# Patient Record
Sex: FEMALE | Race: BLACK OR AFRICAN AMERICAN | NOT HISPANIC OR LATINO | Employment: UNEMPLOYED | URBAN - METROPOLITAN AREA
[De-identification: names, ages, dates, MRNs, and addresses within clinical notes are randomized per-mention and may not be internally consistent; named-entity substitution may affect disease eponyms.]

---

## 2022-11-15 ENCOUNTER — HOSPITAL ENCOUNTER (EMERGENCY)
Facility: HOSPITAL | Age: 37
Discharge: HOME | End: 2022-11-16

## 2022-11-15 ENCOUNTER — APPOINTMENT (OUTPATIENT)
Dept: RADIOLOGY | Facility: HOSPITAL | Age: 37
End: 2022-11-15

## 2022-11-15 VITALS
RESPIRATION RATE: 18 BRPM | BODY MASS INDEX: 37.11 KG/M2 | HEIGHT: 60 IN | WEIGHT: 189 LBS | DIASTOLIC BLOOD PRESSURE: 70 MMHG | TEMPERATURE: 99 F | HEART RATE: 81 BPM | SYSTOLIC BLOOD PRESSURE: 143 MMHG | OXYGEN SATURATION: 100 %

## 2022-11-15 DIAGNOSIS — M25.511 ACUTE PAIN OF RIGHT SHOULDER: Primary | ICD-10-CM

## 2022-11-15 RX ORDER — ACETAMINOPHEN 325 MG/1
650 TABLET ORAL ONCE
Status: COMPLETED | OUTPATIENT
Start: 2022-11-15 | End: 2022-11-15

## 2022-11-15 RX ORDER — LIDOCAINE 50 MG/G
1 PATCH TOPICAL ONCE
Status: DISCONTINUED | OUTPATIENT
Start: 2022-11-15 | End: 2022-11-16 | Stop reason: HOSPADM

## 2022-11-15 RX ADMIN — LIDOCAINE 5% 1 PATCH: 700 PATCH TOPICAL at 23:31

## 2022-11-15 RX ADMIN — ACETAMINOPHEN 650 MG: 325 TABLET, FILM COATED ORAL at 23:32

## 2022-11-16 ENCOUNTER — OFFICE VISIT (OUTPATIENT)
Dept: OBGYN CLINIC | Facility: CLINIC | Age: 37
End: 2022-11-16

## 2022-11-16 VITALS
DIASTOLIC BLOOD PRESSURE: 76 MMHG | BODY MASS INDEX: 37.11 KG/M2 | SYSTOLIC BLOOD PRESSURE: 126 MMHG | HEIGHT: 60 IN | WEIGHT: 189 LBS

## 2022-11-16 DIAGNOSIS — M25.511 ACUTE PAIN OF RIGHT SHOULDER: ICD-10-CM

## 2022-11-16 DIAGNOSIS — M75.51 ACUTE SHOULDER BURSITIS, RIGHT: Primary | ICD-10-CM

## 2022-11-16 RX ORDER — TRIAMCINOLONE ACETONIDE 40 MG/ML
40 INJECTION, SUSPENSION INTRA-ARTICULAR; INTRAMUSCULAR
Status: COMPLETED | OUTPATIENT
Start: 2022-11-16 | End: 2022-11-16

## 2022-11-16 RX ORDER — LIDOCAINE HYDROCHLORIDE 10 MG/ML
4 INJECTION, SOLUTION INFILTRATION; PERINEURAL
Status: COMPLETED | OUTPATIENT
Start: 2022-11-16 | End: 2022-11-16

## 2022-11-16 RX ORDER — OMEPRAZOLE 20 MG/1
20 TABLET, DELAYED RELEASE ORAL DAILY
COMMUNITY

## 2022-11-16 RX ADMIN — TRIAMCINOLONE ACETONIDE 40 MG: 40 INJECTION, SUSPENSION INTRA-ARTICULAR; INTRAMUSCULAR at 13:51

## 2022-11-16 RX ADMIN — LIDOCAINE HYDROCHLORIDE 4 ML: 10 INJECTION, SOLUTION INFILTRATION; PERINEURAL at 13:51

## 2022-11-16 NOTE — DISCHARGE INSTRUCTIONS
RICE - rest, ice, compression (sling), elevation  Take ibuprofen or tylenol every 4-6 hours for pain   Lidocaine patches 12 hours on 12 hours off  Schedule an appointment with the orthopedist as soon as possible   Referral placed  Return to the ER if you begin to develop intense pain in your shoulder that is worse or different than before, cant move arm or it feels numb, cold, blue, red hot, swollen, red streaking from shoulder, fevers, chest pain, shortness of breath

## 2022-11-16 NOTE — PROGRESS NOTES
[facility] [address]  1847 [address] [place]  [address] [address] [phone]      Chief Complaint:  Chief Complaint   Patient presents with   • Right Shoulder - Pain, Swelling, Numbness       Vitals:  /76   Ht 5' (1 524 m)   Wt 85 7 kg (189 lb)   LMP 11/15/2022 (Exact Date)   BMI 36 91 kg/m²     The following portions of the patient's history were reviewed and updated as appropriate: allergies, current medications, past family history, past medical history, past social history, past surgical history, and problem list       Subjective:   Patient ID: Eve Martinez is a 40 y o  female  Here c/o R shoulder pain  Pain for about 3 days  Seen in ER  Note reviewed  XR done  Cant use R arm  RHD  Hurts to reach up/back/out  Shooting pain at times while at rest  Denies neck pain  Taking tylenol/advil PRN  Sharp pain    RIGHT SHOULDER     INDICATION:   injury      COMPARISON:  None     VIEWS:  XR SHOULDER 2+ VW RIGHT  Images: 3     FINDINGS:     There is no acute fracture or dislocation      No significant degenerative changes      No lytic or blastic osseous lesion      Calcifications along the superolateral humeral head which may be indicative of calcific tendinitis      IMPRESSION:     No acute osseous abnormality  Review of Systems   Constitutional: Negative for fatigue and fever  Respiratory: Negative for shortness of breath  Cardiovascular: Negative for chest pain  Gastrointestinal: Negative for abdominal pain and nausea  Genitourinary: Negative for dysuria  Musculoskeletal: Positive for arthralgias  Skin: Negative for rash and wound  Neurological: Negative for weakness and headaches  Objective:  Right Shoulder Exam     Tenderness   The patient is experiencing tenderness in the acromioclavicular joint and acromion      Range of Motion   Active abduction:  70 abnormal   Passive abduction:  130 abnormal   Extension: normal   External rotation: normal   Forward flexion:  140 abnormal     Tests   Fabian test: positive  Impingement: positive    Comments:  Pos empty can            Physical Exam  Constitutional:       Appearance: Normal appearance  She is normal weight  HENT:      Head: Normocephalic  Eyes:      Extraocular Movements: Extraocular movements intact  Pulmonary:      Effort: Pulmonary effort is normal    Musculoskeletal:      Cervical back: Normal range of motion  Skin:     General: Skin is warm and dry  Neurological:      General: No focal deficit present  Mental Status: She is alert and oriented to person, place, and time  Mental status is at baseline  Psychiatric:         Mood and Affect: Mood normal          Behavior: Behavior normal          Thought Content: Thought content normal          Judgment: Judgment normal      Large joint arthrocentesis: R subacromial bursa  Universal Protocol:  Consent: Verbal consent obtained  Risks and benefits: risks, benefits and alternatives were discussed  Consent given by: patient  Time out: Immediately prior to procedure a "time out" was called to verify the correct patient, procedure, equipment, support staff and site/side marked as required  Timeout called at: 11/16/2022 1:49 PM   Site marked: the operative site was marked  Supporting Documentation  Indications: pain   Procedure Details  Location: shoulder - R subacromial bursa  Preparation: Patient was prepped and draped in the usual sterile fashion  Needle size: 25 G  Ultrasound guidance: no  Approach: posterolateral  Medications administered: 4 mL lidocaine 1 %; 40 mg triamcinolone acetonide 40 mg/mL    Patient tolerance: patient tolerated the procedure well with no immediate complications  Dressing:  Sterile dressing applied            I have personally reviewed pertinent films in PACS and my interpretation is XR- R shoulder- no fx, calcific tendintis superolateral humeral head        Assessment/Plan:  Assessment/Plan   Diagnoses and all orders for this visit:    Acute shoulder bursitis, right    Acute pain of right shoulder  -     Ambulatory Referral to Orthopedic Surgery    Other orders  -     omeprazole (PriLOSEC OTC) 20 MG tablet; Take 20 mg by mouth daily  -     Large joint arthrocentesis        Return in about 6 weeks (around 12/28/2022) for Recheck       Esau Gao MD

## 2022-11-16 NOTE — ED PROVIDER NOTES
History  Chief Complaint   Patient presents with   • Arm Pain     Right Arm pain started on Sunday  Declines any falls or injuries  Pt stated its painful to lift  This is a 39 y/o female with no pertinent PMH with right shoulder pain x 2-3 days  Patient states she slept weird on it the night before and woke up with pain  No known injury  She is right handed  States her pain is a 10/10 an will intermittently shoot down her arm  Admits to decreased ROM due to the pain  She denies numbness, tingling, change in color, swelling, fevers, chest pain, shortness of breath, neck pain  States she tried tylenol/advil with no relief  Did not taken anything today  Admits to shoulder injury in the past but no treatments provided  No known allergies  History provided by:  Patient   used: No    Arm Pain  Location:  Right shoulder  Severity:  Moderate  Onset quality:  Sudden  Duration:  3 days  Timing:  Intermittent  Progression:  Waxing and waning  Chronicity:  New  Ineffective treatments:  Advil, tylenol  Associated symptoms: no chest pain, no fever, no headaches, no nausea, no shortness of breath and no vomiting        None       History reviewed  No pertinent past medical history  History reviewed  No pertinent surgical history  History reviewed  No pertinent family history  I have reviewed and agree with the history as documented  E-Cigarette/Vaping     E-Cigarette/Vaping Substances     Social History     Tobacco Use   • Smoking status: Every Day   • Smokeless tobacco: Never   Substance Use Topics   • Drug use: Yes     Types: Marijuana       Review of Systems   Constitutional: Negative for chills and fever  Respiratory: Negative for chest tightness and shortness of breath  Cardiovascular: Negative for chest pain  Gastrointestinal: Negative for nausea and vomiting  Musculoskeletal: Positive for arthralgias  Negative for joint swelling, neck pain and neck stiffness     Skin: Negative for color change  Neurological: Negative for headaches  Psychiatric/Behavioral: Negative for behavioral problems and sleep disturbance  All other systems reviewed and are negative  Physical Exam  Physical Exam  Vitals and nursing note reviewed  Constitutional:       General: She is awake  Appearance: Normal appearance  She is well-developed  HENT:      Head: Normocephalic and atraumatic  Right Ear: External ear normal       Left Ear: External ear normal       Nose: Nose normal    Eyes:      General: No scleral icterus  Extraocular Movements: Extraocular movements intact  Conjunctiva/sclera: Conjunctivae normal       Pupils: Pupils are equal, round, and reactive to light  Cardiovascular:      Rate and Rhythm: Normal rate and regular rhythm  Pulses:           Radial pulses are 2+ on the right side  Heart sounds: Normal heart sounds, S1 normal and S2 normal  No murmur heard  No gallop  Pulmonary:      Effort: Pulmonary effort is normal       Breath sounds: Normal breath sounds  No wheezing, rhonchi or rales  Musculoskeletal:         General: Normal range of motion  Cervical back: Normal range of motion and neck supple  No tenderness  Comments: Right shoulder tender to palpation everywhere  No localized point of tenderness  Decreased ROM due to pain  Drop arm test positive  5/5 strength  No swelling, erythema, ecchymosis, deformity, crepitus noted  NV and sensation intact  Radial pulse +2  Cap refill brisk  Skin:     General: Skin is warm and dry  Neurological:      General: No focal deficit present  Mental Status: She is alert  Psychiatric:         Attention and Perception: Attention and perception normal          Mood and Affect: Mood normal          Behavior: Behavior normal  Behavior is cooperative           Vital Signs  ED Triage Vitals [11/15/22 2206]   Temperature Pulse Respirations Blood Pressure SpO2   99 °F (37 2 °C) 81 18 143/70 100 % Temp Source Heart Rate Source Patient Position - Orthostatic VS BP Location FiO2 (%)   Axillary Monitor Sitting Left arm --      Pain Score       5           Vitals:    11/15/22 2206   BP: 143/70   Pulse: 81   Patient Position - Orthostatic VS: Sitting         Visual Acuity      ED Medications  Medications   acetaminophen (TYLENOL) tablet 650 mg (650 mg Oral Given 11/15/22 2332)       Diagnostic Studies  Results Reviewed     None                 XR shoulder 2+ views RIGHT   Final Result by Star Engle MD (11/16 0901)      No acute osseous abnormality  Workstation performed: [id number]                    Procedures  Procedures         ED Course                                             MDM  Number of Diagnoses or Management Options  Acute pain of right shoulder: new and requires workup  Diagnosis management comments: 39 y/o female here for right shoulder pain   Differential diagnosis: fracture, dislocation, rotator cuff injury, MSK pain   Assessment: acute pain right shoulder  Plan: calcification noted on humeral head on xray but no fractures/dislocations  Based on exam possible rotator cuff injury  Referral sent to ortho and patient given contact info instructed to f/u  Tylenol and lido patch for pain in ED and patient given care instructions  Patient  was given strict return to ER precautions both verbally and in discharge papers  Patient verbalized understanding and agrees with plan          Amount and/or Complexity of Data Reviewed  Tests in the radiology section of CPT®: ordered and reviewed    Risk of Complications, Morbidity, and/or Mortality  Presenting problems: low  Diagnostic procedures: low  Management options: low    Patient Progress  Patient progress: stable      Disposition  Final diagnoses:   Acute pain of right shoulder     Time reflects when diagnosis was documented in both MDM as applicable and the Disposition within this note     Time User Action Codes Description Comment 11/15/2022 11:29 PM Ruben Park Add [M25 511] Acute pain of right shoulder       ED Disposition     ED Disposition   Discharge    Condition   Stable    Date/Time   Tue Nov 15, 2022 11:29 PM    Comment   Desi Chávez discharge to home/self care  Follow-up Information     Follow up With Specialties Details Why Contact Info Additional [address] [facility] Orthopedic Surgery Schedule an appointment as soon as possible for a visit   [address] [place] [address] [address] [phone]  [address] [facility], [address] [place] [address], [place], [place], [facility] [address]     [address] Emergency Department Emergency Medicine Go to  if you begin to develop intense pain in your shoulder that is worse or different than before, cant move arm or it feels numb, cold, blue, red hot, swollen, red streaking from shoulder, fevers, chest pain, shortness of breath [address] [facility] Emergency Department, [address], [facility], Luige Andrew 10          There are no discharge medications for this patient            PDMP Review     None          ED Provider  Electronically Signed by           Celia Wilkins PA-C  11/16/22 4496

## 2023-01-12 ENCOUNTER — EVALUATION (OUTPATIENT)
Dept: PHYSICAL THERAPY | Facility: CLINIC | Age: 38
End: 2023-01-12

## 2023-01-12 DIAGNOSIS — M75.41 IMPINGEMENT SYNDROME OF RIGHT SHOULDER: Primary | ICD-10-CM

## 2023-01-12 DIAGNOSIS — M75.51 ACUTE SHOULDER BURSITIS, RIGHT: ICD-10-CM

## 2023-01-12 NOTE — PROGRESS NOTES
PT Evaluation     Today's date: 2023  Patient name: Sunita Hart  : 1985  MRN: [id number]  Referring provider: Lakisha Suero MD  Dx:   Encounter Diagnosis     ICD-10-CM    1  Impingement syndrome of right shoulder  M75 41 Ambulatory Referral to Physical Therapy      2  Acute shoulder bursitis, right  M75 51 Ambulatory Referral to Physical Therapy                     Assessment  Assessment details: Sunita Hart is a 40 y o  female presenting to outpatient physical therapy at [place] [address] with complaints of anterior, deep R shoulder pain  She presents with decreased self-limited active range of motion, decreased R shoulder and postural strength, limited pec minor flexibility, poor postural awareness, decreased tolerance to activity and decreased functional mobility due to Acute pain of right shoulder due to shoulder impingement  She would benefit from skilled PT services in order to address these deficits and reach maximum level of function  Thank you for the referral!  Impairments: abnormal or restricted ROM, activity intolerance, impaired physical strength, lacks appropriate home exercise program, pain with function and poor posture   Barriers to therapy: None  Understanding of Dx/Px/POC: good  Goals  ST  Independent with HEP in 2 weeks  2  Increase R shoulder AROM to WNL all motions in 3 weeks   3  Good postural awareness in 2 weeks    LT  Achieve FOTO score of 54/100 in 6 weeks   2  Able to lift up to 30#, lift to [facility] levels without R shoulder pain in 6 weeks  3    Strength B traps and R shoulder = 5/5 all motions in 6 weeks      Plan  Patient would benefit from: skilled PT and PT eval  Planned modality interventions: cryotherapy, TENS and thermotherapy: hydrocollator packs  Planned therapy interventions: ADL retraining, flexibility, functional ROM exercises, home exercise program, joint mobilization, manual therapy, neuromuscular re-education, postural training, strengthening, stretching, therapeutic activities and therapeutic exercise  Frequency: 2x week  Duration in weeks: 6  Treatment plan discussed with: patient        Subjective Evaluation    History of Present Illness  Mechanism of injury: Pt reports having R anterior deep shoulder pain since 22 after sleeping on her R arm  Was very restricted in ROM and now has some L shoulder similar pain  Has hx of R shoulder pain 5 years ago that resolved on it's own  OOW, 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  Used to work in manufacturing and scheduling  Home with her children  Having difficulty lifting > 20# especially [place]  Had a cortisone injection in 2022 with only slight improvement  Recurrent probem    Quality of life: good    Pain  Current pain ratin  At best pain ratin  At worst pain rating: 10  Quality: dull ache, sharp and tight  Relieving factors: heat, ice and medications  Aggravating factors: lifting and overhead activity  Progression: worsening    Social Support  Lives with: young children    Employment status: not working  Hand dominance: right      Diagnostic Tests  X-ray: abnormal (R shoulder calcifications along the superolateral humeral head which may be indicative of calcific tendinitis )  Treatments  Previous treatment: medication and injection treatment  Patient Goals  Patient goals for therapy: increased strength, independence with ADLs/IADLs, return to sport/leisure activities, increased motion and decreased pain          Objective     Static Posture     Head  Forward  Shoulders  Rounded  Postural Observations  Seated posture: poor  Standing posture: fair  Correction of posture: makes symptoms better        Observations     Right Shoulder  Negative for effusion  Palpation     Additional Palpation Details  Mod tightness R>L pec minor  Tenderness     Right Shoulder  Tenderness in the [facility] joint  No tenderness in the biceps tendon (proximal) and supraspinatus tendon  Cervical/Thoracic Screen   Cervical range of motion within normal limits    Neurological Testing     Sensation     Shoulder   Left Shoulder   Intact: light touch    Right Shoulder   Intact: light touch    Active Range of Motion   Left Shoulder   Normal active range of motion    Right Shoulder   Flexion: 120 degrees with pain  Extension: WFL  Abduction: WFL  External rotation 0°: WFL  Internal rotation 90°: WFL    Passive Range of Motion   Left Shoulder   Normal passive range of motion    Right Shoulder   Normal passive range of motion    Strength/Myotome Testing     Left Shoulder     Planes of Motion   Flexion: 5   Extension: 5   Abduction: 5   External rotation at 0°: 5   Internal rotation at 0°: 5     Isolated Muscles   Biceps: 5   Middle trapezius: 4   Triceps: 5     Right Shoulder     Planes of Motion   Flexion: 4-   Extension: 5   Abduction: 4-   External rotation at 0°: 4+   Internal rotation at 0°: 5     Isolated Muscles   Biceps: 5   Middle trapezius: 4-   Triceps: 5     Additional Strength Details  Limited AROM and strength on R all self-limited  Tests     Right Shoulder   Positive Hawkin's  Negative empty can and Neer's           POC EXPIRES On:  2/23/23  PRECAUTIONS:  None  CO-MORBIDITES:  None  PERSONAL FACTORS:  Needs appt between 10am-3pm      Manuals HEP 1/12                                                               Neuro Re-Ed     Seated B scap retraction 1/12 1x every 15 min           TB rows B standing 1/12 Black 20           Prone rows L/R             Prone traps 3 way L/R             Wall angels                                       Ther Ex    Doorway pec stretch 1/12 15" 3                                                                                                      Ther Activity                              Gait Training                              Modalities

## 2023-01-19 ENCOUNTER — OFFICE VISIT (OUTPATIENT)
Dept: PHYSICAL THERAPY | Facility: CLINIC | Age: 38
End: 2023-01-19

## 2023-01-19 DIAGNOSIS — M75.41 IMPINGEMENT SYNDROME OF RIGHT SHOULDER: Primary | ICD-10-CM

## 2023-01-19 DIAGNOSIS — M75.51 ACUTE SHOULDER BURSITIS, RIGHT: ICD-10-CM

## 2023-01-19 NOTE — PROGRESS NOTES
Daily Note     Today's date: 2023  Patient name: Shania Hernandez  : 1985  MRN: [id number]  Referring provider: Erik Sheriff MD  Dx:   Encounter Diagnosis     ICD-10-CM    1  Impingement syndrome of right shoulder  M75 41       2  Acute shoulder bursitis, right  M75 51                      Subjective: Pt states feeling sore  Objective: See treatment diary below  Survey Monkey given (_____) and FOTO given (_____)        Assessment: Pt presented to outpatient physical therapy at [place] [address] with complaints of anterior, deep R shoulder pain  She presents with decreased self-limited active range of motion, decreased R shoulder and postural strength, limited pec minor flexibility, poor postural awareness, decreased tolerance to activity and decreased functional mobility due to Acute pain of right shoulder due to shoulder impingement  She would benefit from skilled PT services in order to address these deficits and reach maximum level of function  Introduced new TE with good tolerance, however, pt experienced increased ms fatigue during prone shoulder ext  Plan: Continue plan of care       POC EXPIRES On:  23  PRECAUTIONS:  None  CO-MORBIDITES:  None  PERSONAL FACTORS:  Needs appt between 10am-3pm      Manuals HEP                                                               Neuro Re-Ed     Seated B scap retraction  1x every 15 min 20" 3          TB rows B standing  Black 20 Black 20          Prone rows L/R   0# 10x ea          Prone traps 3 way L/R   0# 10x ea          Wall angels   20x                                    Ther Ex    Retro UBE    L1 5'          Doorway pec stretch  15" 3 15" 5                                                                                                     Ther Activity                              Gait Training                              Modalities

## 2023-01-24 ENCOUNTER — OFFICE VISIT (OUTPATIENT)
Dept: PHYSICAL THERAPY | Facility: CLINIC | Age: 38
End: 2023-01-24

## 2023-01-24 DIAGNOSIS — M75.41 IMPINGEMENT SYNDROME OF RIGHT SHOULDER: Primary | ICD-10-CM

## 2023-01-24 DIAGNOSIS — M75.51 ACUTE SHOULDER BURSITIS, RIGHT: ICD-10-CM

## 2023-01-24 NOTE — PROGRESS NOTES
Daily Note     Today's date: 2023  Patient name: Audi Phillip  : 1985  MRN: [id number]  Referring provider: Javier Shay MD  Dx:   Encounter Diagnosis     ICD-10-CM    1  Impingement syndrome of right shoulder  M75 41       2  Acute shoulder bursitis, right  M75 51                      Subjective: Pt states feeling sore after lv  Objective: See treatment diary below  Survey Monkey given (_____) and FOTO given (_____)        Assessment: Pt presented to outpatient physical therapy at [place] [address] with complaints of anterior, deep R shoulder pain  She presents with decreased self-limited active range of motion, decreased R shoulder and postural strength, limited pec minor flexibility, poor postural awareness, decreased tolerance to activity and decreased functional mobility due to Acute pain of right shoulder due to shoulder impingement  She would benefit from skilled PT services in order to address these deficits and reach maximum level of function  Introduced new TE with good tolerance, however, pt continues to experience increased ms fatigue during prone shoulder ext  Plan: Continue plan of care       POC EXPIRES On:  23  PRECAUTIONS:  None  CO-MORBIDITES:  None  PERSONAL FACTORS:  Needs appt between 10am-3pm       Manuals HEP                                                              Neuro Re-Ed     Seated B scap retraction  1x every 15 min 20" 3 20" 3         TB rows B standing  Black 20 Black 20 Black 20         Prone rows L/R   0# 10x ea 0# 10x ea         Prone traps 3 way L/R   0# 10x ea 0# 10x ea         Wall angels   20x 20x                                   Ther Ex    Retro UBE    L1 5' L1 5'         Doorway pec stretch  15" 3 15" 5 15" 5         Supine flexion    20x         Supine scapular punches    20x                                                                          Ther Activity                              Gait Training Modalities

## 2023-01-26 ENCOUNTER — APPOINTMENT (OUTPATIENT)
Dept: PHYSICAL THERAPY | Facility: CLINIC | Age: 38
End: 2023-01-26

## 2023-01-31 ENCOUNTER — APPOINTMENT (OUTPATIENT)
Dept: PHYSICAL THERAPY | Facility: CLINIC | Age: 38
End: 2023-01-31

## 2023-02-01 ENCOUNTER — APPOINTMENT (OUTPATIENT)
Dept: PHYSICAL THERAPY | Facility: CLINIC | Age: 38
End: 2023-02-01

## 2023-02-01 ENCOUNTER — OFFICE VISIT (OUTPATIENT)
Dept: PHYSICAL THERAPY | Facility: CLINIC | Age: 38
End: 2023-02-01

## 2023-02-01 DIAGNOSIS — M75.51 ACUTE SHOULDER BURSITIS, RIGHT: ICD-10-CM

## 2023-02-01 DIAGNOSIS — M75.41 IMPINGEMENT SYNDROME OF RIGHT SHOULDER: Primary | ICD-10-CM

## 2023-02-01 NOTE — PROGRESS NOTES
Daily Note     Today's date: 2023  Patient name: Yolis Baum  : 1985  MRN: [id number]  Referring provider: Robin Cannon MD  Dx:   Encounter Diagnosis     ICD-10-CM    1  Impingement syndrome of right shoulder  M75 41       2  Acute shoulder bursitis, right  M75 51                      Subjective: Pt states feeling sore after lv  Objective: See treatment diary below  Survey Monkey given (_____) and FOTO given (_____)        Assessment: Pt presented to outpatient physical therapy at [place] [address] with complaints of anterior, deep R shoulder pain  She presents with decreased self-limited active range of motion, decreased R shoulder and postural strength, limited pec minor flexibility, poor postural awareness, decreased tolerance to activity and decreased functional mobility due to Acute pain of right shoulder due to shoulder impingement  She would benefit from skilled PT services in order to address these deficits and reach maximum level of function  Introduced new TE with good tolerance and technique  No complain of pain solely increased ms soreness/fatigue  Plan: Continue plan of care       POC EXPIRES On:  23  PRECAUTIONS:  None  CO-MORBIDITES:  None  PERSONAL FACTORS:  Needs appt between 10am-3pm       Manuals HEP                                                             Neuro Re-Ed     Seated B scap retraction  1x every 15 min 20" 3 20" 3 20" 3        TB rows B standing  Black 20 Black 20 Black 20 Black 20        Prone rows L/R   0# 10x ea 0# 10x ea 0# 15x        Prone traps 3 way L/R   0# 10x ea 0# 10x ea 0# 15x        Wall angels   20x 20x 20x                                  Ther Ex    Retro UBE    L1 5' L1 5' L1 5'        Doorway pec stretch  15" 3 15" 5 15" 5 15" 5        Supine flexion    20x 20x        Supine scapular punches    20x 20x                                                                         Ther Activity    Push ups wall 20x                     Gait Training                              Modalities

## 2023-02-02 ENCOUNTER — APPOINTMENT (OUTPATIENT)
Dept: PHYSICAL THERAPY | Facility: CLINIC | Age: 38
End: 2023-02-02

## 2023-02-07 ENCOUNTER — APPOINTMENT (OUTPATIENT)
Dept: PHYSICAL THERAPY | Facility: CLINIC | Age: 38
End: 2023-02-07

## 2023-02-08 ENCOUNTER — APPOINTMENT (OUTPATIENT)
Dept: PHYSICAL THERAPY | Facility: CLINIC | Age: 38
End: 2023-02-08

## 2023-02-09 ENCOUNTER — APPOINTMENT (OUTPATIENT)
Dept: PHYSICAL THERAPY | Facility: CLINIC | Age: 38
End: 2023-02-09

## 2023-02-13 ENCOUNTER — APPOINTMENT (OUTPATIENT)
Dept: PHYSICAL THERAPY | Facility: CLINIC | Age: 38
End: 2023-02-13

## 2023-02-14 ENCOUNTER — APPOINTMENT (OUTPATIENT)
Dept: PHYSICAL THERAPY | Facility: CLINIC | Age: 38
End: 2023-02-14

## 2023-02-15 ENCOUNTER — APPOINTMENT (OUTPATIENT)
Dept: PHYSICAL THERAPY | Facility: CLINIC | Age: 38
End: 2023-02-15

## 2023-02-16 ENCOUNTER — APPOINTMENT (OUTPATIENT)
Dept: PHYSICAL THERAPY | Facility: CLINIC | Age: 38
End: 2023-02-16

## 2023-02-27 ENCOUNTER — APPOINTMENT (OUTPATIENT)
Dept: PHYSICAL THERAPY | Facility: CLINIC | Age: 38
End: 2023-02-27

## 2024-08-20 ENCOUNTER — OFFICE VISIT (OUTPATIENT)
Dept: FAMILY MEDICINE CLINIC | Facility: CLINIC | Age: 39
End: 2024-08-20
Payer: COMMERCIAL

## 2024-08-20 VITALS
HEIGHT: 60 IN | BODY MASS INDEX: 35.14 KG/M2 | OXYGEN SATURATION: 99 % | HEART RATE: 75 BPM | WEIGHT: 179 LBS | TEMPERATURE: 97.2 F | SYSTOLIC BLOOD PRESSURE: 125 MMHG | DIASTOLIC BLOOD PRESSURE: 81 MMHG

## 2024-08-20 DIAGNOSIS — Z13.0 SCREENING FOR DEFICIENCY ANEMIA: ICD-10-CM

## 2024-08-20 DIAGNOSIS — Z00.00 ANNUAL PHYSICAL EXAM: Primary | ICD-10-CM

## 2024-08-20 PROCEDURE — 99385 PREV VISIT NEW AGE 18-39: CPT | Performed by: NURSE PRACTITIONER

## 2024-08-20 NOTE — PATIENT INSTRUCTIONS
"Patient Education     Routine physical for adults   The Basics   Written by the doctors and editors at [facility]   What is a physical? -- A physical is a routine visit, or \"check-up,\" with your doctor. You might also hear it called a \"wellness visit\" or \"preventive visit.\"  During each visit, the doctor will:   Ask about your physical and mental health   Ask about your habits, behaviors, and lifestyle   Do an exam   Give you vaccines if needed   Talk to you about any medicines you take   Give advice about your health   Answer your questions  Getting regular check-ups is an important part of taking care of your health. It can help your doctor find and treat any problems you have. But it's also important for preventing health problems.  A routine physical is different from a \"sick visit.\" A sick visit is when you see a doctor because of a health concern or problem. Since physicals are scheduled ahead of time, you can think about what you want to ask the doctor.  How often should I get a physical? -- It depends on your age and health. In general, for people age 21 years and older:   If you are younger than 50 years, you might be able to get a physical every 3 years.   If you are 50 years or older, your doctor might recommend a physical every year.  If you have an ongoing health condition, like diabetes or high blood pressure, your doctor will probably want to see you more often.  What happens during a physical? -- In general, each visit will include:   Physical exam - The doctor or nurse will check your height, weight, heart rate, and blood pressure. They will also look at your eyes and ears. They will ask about how you are feeling and whether you have any symptoms that bother you.   Medicines - It's a good idea to bring a list of all the medicines you take to each doctor visit. Your doctor will talk to you about your medicines and answer any questions. Tell them if you are having any side effects that bother you. You " "should also tell them if you are having trouble paying for any of your medicines.   Habits and behaviors - This includes:   Your diet   Your exercise habits   Whether you smoke, drink alcohol, or use drugs   Whether you are sexually active   Whether you feel safe at home  Your doctor will talk to you about things you can do to improve your health and lower your risk of health problems. They will also offer help and support. For example, if you want to quit smoking, they can give you advice and might prescribe medicines. If you want to improve your diet or get more physical activity, they can help you with this, too.   Lab tests, if needed - The tests you get will depend on your age and situation. For example, your doctor might want to check your:   Cholesterol   Blood sugar   Iron level   Vaccines - The recommended vaccines will depend on your age, health, and what vaccines you already had. Vaccines are very important because they can prevent certain serious or deadly infections.   Discussion of screening - \"Screening\" means checking for diseases or other health problems before they cause symptoms. Your doctor can recommend screening based on your age, risk, and preferences. This might include tests to check for:   Cancer, such as breast, prostate, cervical, ovarian, colorectal, prostate, lung, or skin cancer   Sexually transmitted infections, such as chlamydia and gonorrhea   Mental health conditions like depression and anxiety  Your doctor will talk to you about the different types of screening tests. They can help you decide which screenings to have. They can also explain what the results might mean.   Answering questions - The physical is a good time to ask the doctor or nurse questions about your health. If needed, they can refer you to other doctors or specialists, too.  Adults older than 65 years often need other care, too. As you get older, your doctor will talk to you about:   How to prevent falling at " home   Hearing or vision tests   Memory testing   How to take your medicines safely   Making sure that you have the help and support you need at home  All topics are updated as new evidence becomes available and our peer review process is complete.  This topic retrieved from [employer] on: May 02, 2024.  Topic 746323 Version 1.0  Release: 32.4.3 - C32.122  © 2024 UpToDate, Inc. and/or its affiliates. All rights reserved.  Consumer Information Use and Disclaimer   Disclaimer: This generalized information is a limited summary of diagnosis, treatment, and/or medication information. It is not meant to be comprehensive and should be used as a tool to help the user understand and/or assess potential diagnostic and treatment options. It does NOT include all information about conditions, treatments, medications, side effects, or risks that may apply to a specific patient. It is not intended to be medical advice or a substitute for the medical advice, diagnosis, or treatment of a health care provider based on the health care provider's examination and assessment of a patient's specific and unique circumstances. Patients must speak with a health care provider for complete information about their health, medical questions, and treatment options, including any risks or benefits regarding use of medications. This information does not endorse any treatments or medications as safe, effective, or approved for treating a specific patient. UpToDate, Inc. and its affiliates disclaim any warranty or liability relating to this information or the use thereof.The use of this information is governed by the Terms of Use, available at https://www.[employer].com/en/know/clinical-effectiveness-terms. 2024© UpToDate, Inc. and its affiliates and/or licensors. All rights reserved.  Copyright   © 2024 UpToDate, Inc. and/or its affiliates. All rights reserved.    Patient Education     Routine physical for adults   The Basics   Written by the  "doctors and editors at [facility]   What is a physical? -- A physical is a routine visit, or \"check-up,\" with your doctor. You might also hear it called a \"wellness visit\" or \"preventive visit.\"  During each visit, the doctor will:   Ask about your physical and mental health   Ask about your habits, behaviors, and lifestyle   Do an exam   Give you vaccines if needed   Talk to you about any medicines you take   Give advice about your health   Answer your questions  Getting regular check-ups is an important part of taking care of your health. It can help your doctor find and treat any problems you have. But it's also important for preventing health problems.  A routine physical is different from a \"sick visit.\" A sick visit is when you see a doctor because of a health concern or problem. Since physicals are scheduled ahead of time, you can think about what you want to ask the doctor.  How often should I get a physical? -- It depends on your age and health. In general, for people age 21 years and older:   If you are younger than 50 years, you might be able to get a physical every 3 years.   If you are 50 years or older, your doctor might recommend a physical every year.  If you have an ongoing health condition, like diabetes or high blood pressure, your doctor will probably want to see you more often.  What happens during a physical? -- In general, each visit will include:   Physical exam - The doctor or nurse will check your height, weight, heart rate, and blood pressure. They will also look at your eyes and ears. They will ask about how you are feeling and whether you have any symptoms that bother you.   Medicines - It's a good idea to bring a list of all the medicines you take to each doctor visit. Your doctor will talk to you about your medicines and answer any questions. Tell them if you are having any side effects that bother you. You should also tell them if you are having trouble paying for any of your " "medicines.   Habits and behaviors - This includes:   Your diet   Your exercise habits   Whether you smoke, drink alcohol, or use drugs   Whether you are sexually active   Whether you feel safe at home  Your doctor will talk to you about things you can do to improve your health and lower your risk of health problems. They will also offer help and support. For example, if you want to quit smoking, they can give you advice and might prescribe medicines. If you want to improve your diet or get more physical activity, they can help you with this, too.   Lab tests, if needed - The tests you get will depend on your age and situation. For example, your doctor might want to check your:   Cholesterol   Blood sugar   Iron level   Vaccines - The recommended vaccines will depend on your age, health, and what vaccines you already had. Vaccines are very important because they can prevent certain serious or deadly infections.   Discussion of screening - \"Screening\" means checking for diseases or other health problems before they cause symptoms. Your doctor can recommend screening based on your age, risk, and preferences. This might include tests to check for:   Cancer, such as breast, prostate, cervical, ovarian, colorectal, prostate, lung, or skin cancer   Sexually transmitted infections, such as chlamydia and gonorrhea   Mental health conditions like depression and anxiety  Your doctor will talk to you about the different types of screening tests. They can help you decide which screenings to have. They can also explain what the results might mean.   Answering questions - The physical is a good time to ask the doctor or nurse questions about your health. If needed, they can refer you to other doctors or specialists, too.  Adults older than 65 years often need other care, too. As you get older, your doctor will talk to you about:   How to prevent falling at home   Hearing or vision tests   Memory testing   How to take your " medicines safely   Making sure that you have the help and support you need at home  All topics are updated as new evidence becomes available and our peer review process is complete.  This topic retrieved from [facility] on: May 02, 2024.  Topic 145856 Version 1.0  Release: 32.4.3 - C32.122  © 2024 UpToDate, Inc. and/or its affiliates. All rights reserved.  Consumer Information Use and Disclaimer   Disclaimer: This generalized information is a limited summary of diagnosis, treatment, and/or medication information. It is not meant to be comprehensive and should be used as a tool to help the user understand and/or assess potential diagnostic and treatment options. It does NOT include all information about conditions, treatments, medications, side effects, or risks that may apply to a specific patient. It is not intended to be medical advice or a substitute for the medical advice, diagnosis, or treatment of a health care provider based on the health care provider's examination and assessment of a patient's specific and unique circumstances. Patients must speak with a health care provider for complete information about their health, medical questions, and treatment options, including any risks or benefits regarding use of medications. This information does not endorse any treatments or medications as safe, effective, or approved for treating a specific patient. UpToDate, Inc. and its affiliates disclaim any warranty or liability relating to this information or the use thereof.The use of this information is governed by the Terms of Use, available at https://www.[employer].com/en/know/clinical-effectiveness-terms. 2024© UpToDate, Inc. and its affiliates and/or licensors. All rights reserved.  Copyright   © 2024 UpToDate, Inc. and/or its affiliates. All rights reserved.

## 2024-08-20 NOTE — PROGRESS NOTES
Adult Annual Physical  Name: Gabbie Chávez      : 1985      MRN: [id number]  Encounter Provider: JAKE Frias  Encounter Date: 2024   Encounter department: [facility]    Assessment & Plan   1. Annual physical exam  -     Lipid panel; Future  -     Comprehensive metabolic panel; Future  -     Lipid panel  -     Comprehensive metabolic panel  2. Screening for deficiency anemia  -     CBC and Platelet; Future  -     Iron; Future  -     CBC and Platelet  -     Iron    Immunizations and preventive care screenings were discussed with patient today. Appropriate education was printed on patient's after visit summary.    Counseling:  Alcohol/drug use: discussed moderation in alcohol intake, the recommendations for healthy alcohol use, and avoidance of illicit drug use.  Dental Health: discussed importance of regular tooth brushing, flossing, and dental visits.  Injury prevention: discussed safety/seat belts, safety helmets, smoke detectors, carbon dioxide detectors, and smoking near bedding or upholstery.  Sexual health: discussed sexually transmitted diseases, partner selection, use of condoms, avoidance of unintended pregnancy, and contraceptive alternatives.  Exercise: the importance of regular exercise/physical activity was discussed. Recommend exercise 3-5 times per week for at least 30 minutes.          History of Present Illness     Adult Annual Physical:  Patient presents for annual physical. New patient to establish care. No concerns at present. Hx of csection and hernia surgery. Hx of Reflux takes omeprazole. Due physical and labs..     Diet and Physical Activity:  - Diet/Nutrition: well balanced diet.  - Exercise: walking.    Depression Screening:  - PHQ-2 Score: 0    General Health:  - Sleep: sleeps well.  - Hearing: normal hearing bilateral ears.  - Vision: no vision problems.  - Dental: regular dental visits.    /GYN Health:  - Follows with GYN: no.   -  Menopause: perimenopausal.   - Last menstrual cycle: 8/15/2024.   - History of STDs: no    Advanced Care Planning:  - Has an advanced directive?: no    - Has a durable medical POA?: no    - ACP document given to patient?: yes      Review of Systems   Constitutional:  Negative for activity change, appetite change, chills, diaphoresis, fatigue, fever and unexpected weight change.   HENT:  Negative for congestion, dental problem, drooling, ear discharge, ear pain, facial swelling, hearing loss, mouth sores, nosebleeds, postnasal drip, rhinorrhea, sinus pressure, sinus pain, sneezing, sore throat, tinnitus, trouble swallowing and voice change.    Eyes:  Negative for photophobia, pain, discharge, redness, itching and visual disturbance.   Respiratory:  Negative for apnea, cough, choking, chest tightness, shortness of breath, wheezing and stridor.    Cardiovascular:  Negative for chest pain, palpitations and leg swelling.   Gastrointestinal:  Negative for abdominal distention, abdominal pain, anal bleeding, blood in stool, constipation, diarrhea, nausea, rectal pain and vomiting.   Endocrine: Negative for cold intolerance, heat intolerance, polydipsia, polyphagia and polyuria.   Genitourinary:  Negative for decreased urine volume, difficulty urinating, dyspareunia, dysuria, enuresis, flank pain, frequency, genital sores, hematuria, menstrual problem, pelvic pain, urgency, vaginal bleeding, vaginal discharge and vaginal pain.   Musculoskeletal:  Negative for arthralgias, back pain, gait problem, joint swelling, myalgias, neck pain and neck stiffness.   Skin:  Negative for color change, pallor, rash and wound.   Neurological:  Negative for dizziness, tremors, seizures, syncope, facial asymmetry, speech difficulty, weakness, light-headedness, numbness and headaches.   Hematological:  Negative for adenopathy. Does not bruise/bleed easily.   Psychiatric/Behavioral:  Negative for agitation, behavioral problems, confusion,  "decreased concentration, dysphoric mood, hallucinations, self-injury, sleep disturbance and suicidal ideas. The patient is not nervous/anxious and is not hyperactive.      Pertinent Medical History         Current Outpatient Medications on File Prior to Visit   Medication Sig Dispense Refill    omeprazole (PriLOSEC OTC) 20 MG tablet Take 20 mg by mouth daily       No current facility-administered medications on file prior to visit.      Social History     Tobacco Use    Smoking status: Every Day    Smokeless tobacco: Never   Vaping Use    Vaping status: Every Day   Substance and Sexual Activity    Alcohol use: Yes    Drug use: Yes     Types: Marijuana    Sexual activity: Not on file       Objective     /81 (BP Location: Left arm, Patient Position: Sitting, Cuff Size: Standard)   Pulse 75   Temp (!) 97.2 °F (36.2 °C) (Tympanic)   Ht 4' 11.5\" (1.511 m)   Wt 81.2 kg (179 lb)   LMP  (LMP Unknown)   SpO2 99%   BMI 35.55 kg/m²     Physical Exam  Vitals and nursing note reviewed.   Constitutional:       General: She is not in acute distress.     Appearance: Normal appearance. She is not ill-appearing, toxic-appearing or diaphoretic.   HENT:      Head: Normocephalic.      Right Ear: Tympanic membrane, ear canal and external ear normal. There is no impacted cerumen.      Left Ear: Tympanic membrane, ear canal and external ear normal. There is no impacted cerumen.      Nose: Nose normal. No congestion or rhinorrhea.      Mouth/Throat:      Mouth: Mucous membranes are moist.      Pharynx: Oropharynx is clear. No oropharyngeal exudate or posterior oropharyngeal erythema.   Eyes:      General: No scleral icterus.        Right eye: No discharge.         Left eye: No discharge.      Extraocular Movements: Extraocular movements intact.      Conjunctiva/sclera: Conjunctivae normal.      Pupils: Pupils are equal, round, and reactive to light.   Neck:      Vascular: No carotid bruit.   Cardiovascular:      Rate and " Rhythm: Normal rate and regular rhythm.      Pulses: Normal pulses.      Heart sounds: Normal heart sounds. No murmur heard.     No friction rub. No gallop.   Pulmonary:      Effort: Pulmonary effort is normal. No respiratory distress.      Breath sounds: Normal breath sounds. No stridor. No wheezing, rhonchi or rales.   Chest:      Chest wall: No tenderness.   Abdominal:      General: Abdomen is flat. Bowel sounds are normal. There is no distension.      Palpations: Abdomen is soft. There is no mass.      Tenderness: There is no abdominal tenderness. There is no right CVA tenderness, left CVA tenderness, guarding or rebound.      Hernia: No hernia is present.   Musculoskeletal:         General: No swelling, tenderness, deformity or signs of injury. Normal range of motion.      Cervical back: Normal range of motion and neck supple. No rigidity or tenderness. No muscular tenderness.      Right lower leg: No edema.      Left lower leg: No edema.   Lymphadenopathy:      Cervical: No cervical adenopathy.   Skin:     General: Skin is warm.      Capillary Refill: Capillary refill takes less than 2 seconds.      Coloration: Skin is not jaundiced or pale.      Findings: No bruising, erythema, lesion or rash.   Neurological:      General: No focal deficit present.      Mental Status: She is alert and oriented to person, place, and time.      Cranial Nerves: No cranial nerve deficit.      Sensory: No sensory deficit.      Motor: No weakness.      Coordination: Coordination normal.      Gait: Gait normal.      Deep Tendon Reflexes: Reflexes normal.   Psychiatric:         Mood and Affect: Mood normal.         Behavior: Behavior normal.         Thought Content: Thought content normal.         Judgment: Judgment normal.

## 2024-10-14 ENCOUNTER — HOSPITAL ENCOUNTER (EMERGENCY)
Facility: HOSPITAL | Age: 39
Discharge: HOME | End: 2024-10-14
Attending: EMERGENCY MEDICINE
Payer: COMMERCIAL

## 2024-10-14 VITALS
TEMPERATURE: 98.8 F | BODY MASS INDEX: 35.75 KG/M2 | SYSTOLIC BLOOD PRESSURE: 186 MMHG | WEIGHT: 180 LBS | DIASTOLIC BLOOD PRESSURE: 101 MMHG | OXYGEN SATURATION: 99 % | HEART RATE: 84 BPM | RESPIRATION RATE: 18 BRPM

## 2024-10-14 DIAGNOSIS — K02.9 PAIN DUE TO DENTAL CARIES: Primary | ICD-10-CM

## 2024-10-14 PROCEDURE — 99282 EMERGENCY DEPT VISIT SF MDM: CPT

## 2024-10-14 PROCEDURE — 64400 NJX AA&/STRD TRIGEMINAL NRV: CPT | Performed by: EMERGENCY MEDICINE

## 2024-10-14 PROCEDURE — 99284 EMERGENCY DEPT VISIT MOD MDM: CPT | Performed by: EMERGENCY MEDICINE

## 2024-10-14 RX ORDER — BUPIVACAINE HYDROCHLORIDE AND EPINEPHRINE 5; 5 MG/ML; UG/ML
1.8 INJECTION, SOLUTION EPIDURAL; INTRACAUDAL; PERINEURAL ONCE
Status: COMPLETED | OUTPATIENT
Start: 2024-10-14 | End: 2024-10-14

## 2024-10-14 RX ADMIN — BUPIVACAINE HYDROCHLORIDE AND EPINEPHRINE BITARTRATE 1.8 ML: 5; .005 INJECTION, SOLUTION SUBCUTANEOUS at 13:02

## 2024-10-14 NOTE — ED PROVIDER NOTES
Time reflects when diagnosis was documented in both MDM as applicable and the Disposition within this note       Time User Action Codes Description Comment    10/14/2024  1:11 PM Michele Feliz Add [K08.89] Pain, dental     10/14/2024  1:11 PM Michele Feliz Add [K02.9] Pain due to dental caries     10/14/2024  1:11 PM Michele Feliz Modify [K02.9] Pain due to dental caries     10/14/2024  1:11 PM Michele Feliz Remove [K08.89] Pain, dental           ED Disposition       ED Disposition   Discharge    Condition   Stable    Date/Time   Mon Oct 14, 2024  1:11 PM    Comment   Gabbie Chávez discharge to home/self care.                   Assessment & Plan       Medical Decision Making  Differential diagnosis: Dental caries, apical abscess, gingivitis  Patient without any localized gingival inflammation or drainable abscess.  Patient with extensive dental caries which will require dental intervention.  Patient without fever.  Discussed plan for dental block and the need for outpatient dental intervention.  Patient will be given information for dental clinic    The patient (and any family present) verbalized understanding of the discharge instructions and warnings that would necessitate return to the Emergency Department.    All questions were answered prior to discharge.    Risk  Prescription drug management.             Medications   bupivacaine-epinephrine (PF) (MARCAINE/EPINEPHRINE PF) 0.5 %-1:[id number] injection 1.8 mL (1.8 mL Infiltration Given by Other 10/14/24 1302)       ED Risk Strat Scores                           SBIRT 20yo+      Flowsheet Row Most Recent Value   Initial Alcohol Screen: US AUDIT-C     1. How often do you have a drink containing alcohol? 0 Filed at: 10/14/2024 1238   2. How many drinks containing alcohol do you have on a typical day you are drinking?  0 Filed at: 10/14/2024 1238   3a. Male UNDER 65: How often do you have five or more drinks on one occasion? 0 Filed at: 10/14/2024 1238   3b.  FEMALE Any Age, or MALE 65+: How often do you have 4 or more drinks on one occassion? 0 Filed at: 10/14/2024 1238   Audit-C Score 0 Filed at: 10/14/2024 1238   ANA: How many times in the past year have you...    Used an illegal drug or used a prescription medication for non-medical reasons? Never Filed at: 10/14/2024 1238                            History of Present Illness       Chief Complaint   Patient presents with    Dental Pain     Pt to ED from home for top L dental pain, states she needs teeth pulled but no where accepts her insurance. Pain x 3 days. Tylenol and advil not helping, last doses arounf 0500 this morning       History reviewed. No pertinent past medical history.   Past Surgical History:   Procedure Laterality Date     SECTION  2005     SECTION  10/2014    HERNIA REPAIR  2013      Family History   Problem Relation Age of Onset    Cancer Maternal Grandmother     Dementia Paternal Grandmother       Social History     Tobacco Use    Smoking status: Every Day     Current packs/day: 0.25     Average packs/day: 0.3 packs/day for 0.8 years (0.2 ttl pk-yrs)     Types: Cigarettes     Start date:     Smokeless tobacco: Never   Vaping Use    Vaping status: Every Day   Substance Use Topics    Alcohol use: Yes    Drug use: Yes     Types: Marijuana      E-Cigarette/Vaping    E-Cigarette Use Current Every Day User       E-Cigarette/Vaping Substances      I have reviewed and agree with the history as documented.     9-year-old female with a history of chronic dental issues presents for evaluation of left upper dental pain.  Patient states that she was informed that she needs to have teeth removed but cannot find a dentist that takes her insurance.      Dental Pain      Review of Systems        Objective       ED Triage Vitals [10/14/24 1212]   Temperature Pulse Blood Pressure Respirations SpO2 Patient Position - Orthostatic VS   98.8 °F (37.1 °C) 84 (!) 186/101 18 99 % Sitting      Temp  Source Heart Rate Source BP Location FiO2 (%) Pain Score    Temporal Monitor Left arm -- 10 - Worst Possible Pain      Vitals      Date and Time Temp Pulse SpO2 Resp BP Pain Score FACES Pain Rating User   10/14/24 1212 98.8 °F (37.1 °C) 84 99 % 18 186/101 10 - Worst Possible Pain -- ML            Physical Exam  HENT:      Mouth/Throat:      Dentition: Abnormal dentition. Dental caries present. No gingival swelling, dental abscesses or gum lesions.      Tongue: No lesions.      Pharynx: Oropharynx is clear.         Results Reviewed       None            No orders to display       Nerve block    Date/Time: 10/14/2024 1:12 PM    Performed by: Michele Feliz DO  Authorized by: Michele Feliz DO    Patient location:  ED  [place] Protocol:  procedure performed by consultantConsent: Verbal consent obtained.  Consent given by: patient  Patient understanding: patient states understanding of the procedure being performed  Patient identity confirmed: verbally with patient    Indications:     Indications:  Pain relief  Location:     Body area:  Head    Head nerve blocked: left infraorbital.    Laterality:  Left  Procedure details (see MAR for exact dosages):     Block needle gauge:  25 G    Block anesthetic: 1.8ml vivacaine.    Injection procedure:  Anatomic landmarks identified, anatomic landmarks palpated, incremental injection, introduced needle and negative aspiration for blood  Post-procedure details:     Outcome:  Anesthesia achieved    Patient tolerance of procedure:  Tolerated well, no immediate complications      ED Medication and Procedure Management   Prior to Admission Medications   Prescriptions Last Dose Informant Patient Reported? Taking?   omeprazole (PriLOSEC OTC) 20 MG tablet   Yes No   Sig: Take 20 mg by mouth daily      Facility-Administered Medications: None     Patient's Medications   Discharge Prescriptions    No medications on file       ED SEPSIS DOCUMENTATION   Time reflects when diagnosis  was documented in both MDM as applicable and the Disposition within this note       Time User Action Codes Description Comment    10/14/2024  1:11 PM Michele Feliz Add [K08.89] Pain, dental     10/14/2024  1:11 PM Michele Feliz Add [K02.9] Pain due to dental caries     10/14/2024  1:11 PM Michele Feliz Modify [K02.9] Pain due to dental caries     10/14/2024  1:11 PM Michele Feliz Remove [K08.89] Pain, dental                  Michele Feliz DO  10/14/24 1203

## 2024-10-16 ENCOUNTER — TELEPHONE (OUTPATIENT)
Dept: OTHER | Facility: OTHER | Age: 39
End: 2024-10-16

## 2024-10-16 ENCOUNTER — TELEPHONE (OUTPATIENT)
Dept: FAMILY MEDICINE CLINIC | Facility: CLINIC | Age: 39
End: 2024-10-16

## 2024-10-16 ENCOUNTER — HOSPITAL ENCOUNTER (EMERGENCY)
Facility: HOSPITAL | Age: 39
Discharge: HOME | End: 2024-10-16
Attending: EMERGENCY MEDICINE
Payer: COMMERCIAL

## 2024-10-16 VITALS
TEMPERATURE: 98 F | HEART RATE: 75 BPM | RESPIRATION RATE: 18 BRPM | DIASTOLIC BLOOD PRESSURE: 69 MMHG | SYSTOLIC BLOOD PRESSURE: 150 MMHG | OXYGEN SATURATION: 99 %

## 2024-10-16 DIAGNOSIS — D50.0 IRON DEFICIENCY ANEMIA DUE TO CHRONIC BLOOD LOSS: Primary | ICD-10-CM

## 2024-10-16 DIAGNOSIS — N92.0 MENORRHAGIA WITH REGULAR CYCLE: ICD-10-CM

## 2024-10-16 LAB
ABO GROUP BLD: NORMAL
ABO GROUP BLD: NORMAL
ALBUMIN SERPL-MCNC: 4.1 G/DL (ref 3.9–4.9)
ALP SERPL-CCNC: 125 IU/L (ref 44–121)
ALT SERPL-CCNC: 15 IU/L (ref 0–32)
ANION GAP SERPL CALCULATED.3IONS-SCNC: 8 MMOL/L (ref 4–13)
APTT PPP: 29 SECONDS (ref 23–34)
AST SERPL-CCNC: 21 IU/L (ref 0–40)
BASOPHILS # BLD AUTO: 0.05 THOUSANDS/ΜL (ref 0–0.1)
BASOPHILS NFR BLD AUTO: 1 % (ref 0–1)
BILIRUB SERPL-MCNC: 0.3 MG/DL (ref 0–1.2)
BLD GP AB SCN SERPL QL: NEGATIVE
BUN SERPL-MCNC: 11 MG/DL (ref 5–25)
BUN SERPL-MCNC: 6 MG/DL (ref 6–20)
BUN/CREAT SERPL: 8 (ref 9–23)
CALCIUM SERPL-MCNC: 9.1 MG/DL (ref 8.7–10.2)
CALCIUM SERPL-MCNC: 9.2 MG/DL (ref 8.4–10.2)
CHLORIDE SERPL-SCNC: 101 MMOL/L (ref 96–106)
CHLORIDE SERPL-SCNC: 104 MMOL/L (ref 96–108)
CHOLEST SERPL-MCNC: 175 MG/DL (ref 100–199)
CHOLEST/HDLC SERPL: 2.7 RATIO (ref 0–4.4)
CO2 SERPL-SCNC: 23 MMOL/L (ref 20–29)
CO2 SERPL-SCNC: 26 MMOL/L (ref 21–32)
CREAT SERPL-MCNC: 0.71 MG/DL (ref 0.6–1.3)
CREAT SERPL-MCNC: 0.72 MG/DL (ref 0.57–1)
EGFR: 109 ML/MIN/1.73
EOSINOPHIL # BLD AUTO: 0.06 THOUSAND/ΜL (ref 0–0.61)
EOSINOPHIL NFR BLD AUTO: 1 % (ref 0–6)
ERYTHROCYTE [DISTWIDTH] IN BLOOD BY AUTOMATED COUNT: 22 % (ref 11.7–15.4)
ERYTHROCYTE [DISTWIDTH] IN BLOOD BY AUTOMATED COUNT: 22.2 % (ref 11.6–15.1)
EXT PREGNANCY TEST URINE: NEGATIVE
EXT. CONTROL: NORMAL
GFR SERPL CREATININE-BSD FRML MDRD: 107 ML/MIN/1.73SQ M
GLOBULIN SER-MCNC: 3 G/DL (ref 1.5–4.5)
GLUCOSE SERPL-MCNC: 79 MG/DL (ref 70–99)
GLUCOSE SERPL-MCNC: 87 MG/DL (ref 65–140)
HCT VFR BLD AUTO: 23.6 % (ref 34.8–46.1)
HCT VFR BLD AUTO: 24.5 % (ref 34–46.6)
HDLC SERPL-MCNC: 64 MG/DL
HGB BLD-MCNC: 5.6 G/DL (ref 11.5–15.4)
HGB BLD-MCNC: 5.9 G/DL (ref 11.1–15.9)
IMM GRANULOCYTES # BLD AUTO: 0.03 THOUSAND/UL (ref 0–0.2)
IMM GRANULOCYTES NFR BLD AUTO: 0 % (ref 0–2)
INR PPP: 0.97 (ref 0.85–1.19)
IRON SERPL-MCNC: 15 UG/DL (ref 27–159)
LDLC SERPL CALC-MCNC: 83 MG/DL (ref 0–99)
LYMPHOCYTES # BLD AUTO: 2.6 THOUSANDS/ΜL (ref 0.6–4.47)
LYMPHOCYTES NFR BLD AUTO: 30 % (ref 14–44)
MCH RBC QN AUTO: 13.1 PG (ref 26.6–33)
MCH RBC QN AUTO: 13.4 PG (ref 26.8–34.3)
MCHC RBC AUTO-ENTMCNC: 23.7 G/DL (ref 31.4–37.4)
MCHC RBC AUTO-ENTMCNC: 24.1 G/DL (ref 31.5–35.7)
MCV RBC AUTO: 54 FL (ref 79–97)
MCV RBC AUTO: 56 FL (ref 82–98)
MONOCYTES # BLD AUTO: 0.53 THOUSAND/ΜL (ref 0.17–1.22)
MONOCYTES NFR BLD AUTO: 6 % (ref 4–12)
MORPHOLOGY BLD-IMP: NORMAL
NEUTROPHILS # BLD AUTO: 5.42 THOUSANDS/ΜL (ref 1.85–7.62)
NEUTS SEG NFR BLD AUTO: 62 % (ref 43–75)
NRBC BLD AUTO-RTO: 0 /100 WBCS
NRBC BLD AUTO-RTO: 1 % (ref 0–0)
PLATELET # BLD AUTO: 372 THOUSANDS/UL (ref 149–390)
PLATELET # BLD AUTO: 379 X10E3/UL (ref 150–450)
POTASSIUM SERPL-SCNC: 3.6 MMOL/L (ref 3.5–5.3)
POTASSIUM SERPL-SCNC: 3.9 MMOL/L (ref 3.5–5.2)
PROT SERPL-MCNC: 7.1 G/DL (ref 6–8.5)
PROTHROMBIN TIME: 13.4 SECONDS (ref 12.3–15)
RBC # BLD AUTO: 4.19 MILLION/UL (ref 3.81–5.12)
RBC # BLD AUTO: 4.51 X10E6/UL (ref 3.77–5.28)
RH BLD: POSITIVE
RH BLD: POSITIVE
SL AMB VLDL CHOLESTEROL CALC: 28 MG/DL (ref 5–40)
SODIUM SERPL-SCNC: 138 MMOL/L (ref 135–147)
SODIUM SERPL-SCNC: 139 MMOL/L (ref 134–144)
SPECIMEN EXPIRATION DATE: NORMAL
TRIGL SERPL-MCNC: 164 MG/DL (ref 0–149)
WBC # BLD AUTO: 8.2 X10E3/UL (ref 3.4–10.8)
WBC # BLD AUTO: 8.69 THOUSAND/UL (ref 4.31–10.16)

## 2024-10-16 PROCEDURE — 86850 RBC ANTIBODY SCREEN: CPT | Performed by: EMERGENCY MEDICINE

## 2024-10-16 PROCEDURE — P9016 RBC LEUKOCYTES REDUCED: HCPCS

## 2024-10-16 PROCEDURE — 85730 THROMBOPLASTIN TIME PARTIAL: CPT | Performed by: EMERGENCY MEDICINE

## 2024-10-16 PROCEDURE — 86901 BLOOD TYPING SEROLOGIC RH(D): CPT | Performed by: EMERGENCY MEDICINE

## 2024-10-16 PROCEDURE — 86900 BLOOD TYPING SEROLOGIC ABO: CPT | Performed by: EMERGENCY MEDICINE

## 2024-10-16 PROCEDURE — 81025 URINE PREGNANCY TEST: CPT | Performed by: EMERGENCY MEDICINE

## 2024-10-16 PROCEDURE — 80048 BASIC METABOLIC PNL TOTAL CA: CPT | Performed by: EMERGENCY MEDICINE

## 2024-10-16 PROCEDURE — 36430 TRANSFUSION BLD/BLD COMPNT: CPT

## 2024-10-16 PROCEDURE — 85025 COMPLETE CBC W/AUTO DIFF WBC: CPT | Performed by: EMERGENCY MEDICINE

## 2024-10-16 PROCEDURE — 99284 EMERGENCY DEPT VISIT MOD MDM: CPT | Performed by: EMERGENCY MEDICINE

## 2024-10-16 PROCEDURE — 36415 COLL VENOUS BLD VENIPUNCTURE: CPT | Performed by: EMERGENCY MEDICINE

## 2024-10-16 PROCEDURE — 86923 COMPATIBILITY TEST ELECTRIC: CPT

## 2024-10-16 PROCEDURE — 85610 PROTHROMBIN TIME: CPT | Performed by: EMERGENCY MEDICINE

## 2024-10-16 PROCEDURE — 99283 EMERGENCY DEPT VISIT LOW MDM: CPT

## 2024-10-16 RX ORDER — ONDANSETRON 4 MG/1
4 TABLET, ORALLY DISINTEGRATING ORAL ONCE
Status: COMPLETED | OUTPATIENT
Start: 2024-10-16 | End: 2024-10-16

## 2024-10-16 RX ADMIN — ONDANSETRON 4 MG: 4 TABLET, ORALLY DISINTEGRATING ORAL at 19:50

## 2024-10-16 NOTE — TELEPHONE ENCOUNTER
Lab Result: Hemoglobin: 5.9   Date/Time Drawn: 10/15/2024 / 2:00 am   Ordering Provider: Priscilla Schreiber    Lab Personnel's Name: Abhilash       Read back to the lab as documented above     [x]     Secure Chat message sent to on-call provider  [x]     Provider confirmed receipt of message     [x]

## 2024-10-16 NOTE — TELEPHONE ENCOUNTER
Called patient at request of JAKE O[place]iedo.    Call recd from [facility] this morning with a critical value Hgb 5.9.    Advised patient per Priscilla that she needs to go to ER for evaluation.    Patient expressed verbal understanding and will go to  [facility]

## 2024-10-16 NOTE — ED PROVIDER NOTES
Time reflects when diagnosis was documented in both MDM as applicable and the Disposition within this note       Time User Action Codes Description Comment    10/16/2024  9:12 PM Hesham Ward Add [D50.0] Iron deficiency anemia due to chronic blood loss     10/16/2024  9:13 PM Hesham Ward Add [N92.0] Menorrhagia with regular cycle           ED Disposition       ED Disposition   Discharge    Condition   Stable    Date/Time   Wed Oct 16, 2024  9:12 PM    Comment   Gabbie Chávez discharge to home/self care.                   Assessment & Plan       Medical Decision Making  Asymptomatic 39-year-old female with abnormal outpatient hemoglobin of 5.9.  No signs of symptomatic anemia.  Will recheck labs.    Amount and/or Complexity of Data Reviewed  Labs: ordered.    Risk  Prescription drug management.        ED Course as of 10/16/24 2122   Wed Oct 16, 2024   2120 Discussed with OB/GYN, Dr. Giordano, who states patient to be seen in the office before her next periods began.  No further instructions at this time.  Ambulatory referral given to patient.       Medications   ondansetron (ZOFRAN-ODT) dispersible tablet 4 mg (4 mg Oral Given 10/16/24 1950)       ED Risk Strat Scores                           SBIRT 22yo+      Flowsheet Row Most Recent Value   Initial Alcohol Screen: US AUDIT-C     1. How often do you have a drink containing alcohol? 0 Filed at: 10/16/2024 1230   2. How many drinks containing alcohol do you have on a typical day you are drinking?  0 Filed at: 10/16/2024 1230   3a. Male UNDER 65: How often do you have five or more drinks on one occasion? 0 Filed at: 10/16/2024 1230   3b. FEMALE Any Age, or MALE 65+: How often do you have 4 or more drinks on one occassion? 0 Filed at: 10/16/2024 1230   Audit-C Score 0 Filed at: 10/16/2024 1230   ANA: How many times in the past year have you...    Used an illegal drug or used a prescription medication for non-medical reasons? Never Filed at: 10/16/2024  1230                            History of Present Illness       Chief Complaint   Patient presents with    Abnormal Lab     Pt reports had lab work done yesterday low hbg. Pt reports typically dips around period. Pt reports HA and left arm tingling       History reviewed. No pertinent past medical history.   Past Surgical History:   Procedure Laterality Date     SECTION  2005     SECTION  10/2014    HERNIA REPAIR  2013      Family History   Problem Relation Age of Onset    Cancer Maternal Grandmother     Dementia Paternal Grandmother       Social History     Tobacco Use    Smoking status: Every Day     Current packs/day: 0.25     Average packs/day: 0.3 packs/day for 0.8 years (0.2 ttl pk-yrs)     Types: Cigarettes     Start date:     Smokeless tobacco: Never   Vaping Use    Vaping status: Every Day   Substance Use Topics    Alcohol use: Yes    Drug use: Yes     Types: Marijuana      E-Cigarette/Vaping    E-Cigarette Use Current Every Day User       E-Cigarette/Vaping Substances      I have reviewed and agree with the history as documented.     39-year-old female has not been to a doctor for primary care for approximate 10 years, she was losing health insurance.  She had a history and physical as a new patient by a local doctor recently.  She was called today because hemoglobin came back at 5.9.  Patient has no signs or symptoms of anemia.  She states she has very heavy menstrual periods saturates a very large pad every hour for days during her periods over the last 10 years.  Last menstrual period was approximate 2 weeks ago.  Has not seen any other signs of bleeding.  Denies dyspnea, chest pain, palpitations, lightheadedness or syncope.        Review of Systems   Constitutional:  Negative for activity change, chills, fatigue and fever.   HENT:  Negative for nosebleeds.    Respiratory:  Negative for shortness of breath.    Cardiovascular:  Negative for chest pain and palpitations.    Gastrointestinal:  Negative for abdominal pain and blood in stool.   Genitourinary:  Negative for hematuria.   Neurological:  Negative for syncope, light-headedness and headaches.           Objective       ED Triage Vitals [10/16/24 1210]   Temperature Pulse Blood Pressure Respirations SpO2 Patient Position - Orthostatic VS   (!) 97.3 °F (36.3 °C) 98 146/95 18 100 % Sitting      Temp Source Heart Rate Source BP Location FiO2 (%) Pain Score    Temporal Monitor Right arm -- --      Vitals      Date and Time Temp Pulse SpO2 Resp BP Pain Score FACES Pain Rating User   10/16/24 2039 98.5 °F (36.9 °C) 84 98 % 18 145/79 -- -- SV   10/16/24 2000 97.8 °F (36.6 °C) 83 99 % 16 141/62 -- -- EW   10/16/24 1950 97.8 °F (36.6 °C) 83 98 % 16 142/65 -- -- EW   10/16/24 1945 97.8 °F (36.6 °C) 84 100 % 16 141/65 -- -- EW   10/16/24 1930 -- 84 99 % 16 141/65 -- -- EW   10/16/24 1915 97.8 °F (36.6 °C) 77 99 % 16 148/72 -- -- EW   10/16/24 1900 97.8 °F (36.6 °C) 77 99 % 16 148/72 -- -- EW   10/16/24 1815 -- 77 100 % -- 161/68 -- -- EW   10/16/24 1715 -- 85 100 % 16 139/70 -- -- EW   10/16/24 1710 97.8 °F (36.6 °C) 81 100 % 18 139/70 -- -- EW   10/16/24 1700 97.8 °F (36.6 °C) 81 100 % 18 141/80 -- -- EW   10/16/24 1655 97.6 °F (36.4 °C) 80 96 % 16 128/59 -- -- EW   10/16/24 1645 -- 76 100 % 16 128/59 -- -- EW   10/16/24 1630 -- 90 91 % -- -- -- -- EW   10/16/24 1210 97.3 °F (36.3 °C) 98 100 % 18 146/95 -- -- CM            Physical Exam  Vitals and nursing note reviewed.   Constitutional:       General: She is not in acute distress.     Appearance: She is well-developed. She is not ill-appearing or diaphoretic.   HENT:      Head: Normocephalic and atraumatic.      Right Ear: External ear normal.      Left Ear: External ear normal.      Mouth/Throat:      Mouth: Mucous membranes are moist.      Pharynx: Oropharynx is clear.   Eyes:      General: No scleral icterus.     Extraocular Movements: EOM normal.      Conjunctiva/sclera:  Conjunctivae normal.      Pupils: Pupils are equal, round, and reactive to light.   Cardiovascular:      Rate and Rhythm: Normal rate and regular rhythm.      Pulses: Normal pulses.      Heart sounds: Normal heart sounds.   Pulmonary:      Effort: Pulmonary effort is normal. No respiratory distress.      Breath sounds: Normal breath sounds.   Abdominal:      General: Bowel sounds are normal.      Palpations: Abdomen is soft.      Tenderness: There is no abdominal tenderness.   Musculoskeletal:         General: No tenderness or edema. Normal range of motion.      Cervical back: Neck supple. No tenderness.      Right lower leg: No edema.      Left lower leg: No edema.   Skin:     General: Skin is warm and dry.      Capillary Refill: Capillary refill takes less than 2 seconds.      Coloration: Skin is not jaundiced or pale.      Findings: No rash.   Neurological:      General: No focal deficit present.      Mental Status: She is alert and oriented to person, place, and time. Mental status is at baseline.      Cranial Nerves: No cranial nerve deficit.      Sensory: No sensory deficit.      Motor: No weakness.      Coordination: Coordination normal.      Deep Tendon Reflexes: Reflexes are normal and symmetric.   Psychiatric:         Mood and Affect: Mood and affect and mood normal.         Behavior: Behavior normal.         Results Reviewed       Procedure Component Value Units Date/Time    POCT pregnancy, urine [[id number]]  (Normal) Resulted: 10/16/24 1605    Lab Status: Final result Updated: 10/16/24 1605     EXT Preg Test, Ur Negative     Control Valid    Protime-INR [[id number]]  (Normal) Collected: 10/16/24 1452    Lab Status: Final result Specimen: Blood from Arm, Right Updated: 10/16/24 1510     Protime 13.4 seconds      INR 0.97    Narrative:      INR Therapeutic Range    Indication                                             INR Range      Atrial Fibrillation                                                2.0-3.0  Hypercoagulable State                                    2.0.2.3  Left Ventricular Asist Device                            2.0-3.0  Mechanical Heart Valve                                  -    Aortic(with afib, MI, embolism, HF, LA enlargement,    and/or coagulopathy)                                     2.0-3.0 (2.5-3.5)     Mitral                                                             2.5-3.5  Prosthetic/Bioprosthetic Heart Valve               2.0-3.0  Venous thromboembolism (VTE: VT, PE        2.0-3.0    APTT [[id number]]  (Normal) Collected: 10/16/24 1452    Lab Status: Final result Specimen: Blood from Arm, Right Updated: 10/16/24 1510     PTT 29 seconds     CBC and differential [[id number]]  (Abnormal) Collected: 10/16/24 1315    Lab Status: Final result Specimen: Blood from Arm, Right Updated: 10/16/24 1414     WBC 8.69 Thousand/uL      RBC 4.19 Million/uL      Hemoglobin 5.6 g/dL      Hematocrit 23.6 %      MCV 56 fL      MCH 13.4 pg      MCHC 23.7 g/dL      RDW 22.2 %      Platelets 372 Thousands/uL      nRBC 0 /100 WBCs      Segmented % 62 %      Immature Grans % 0 %      Lymphocytes % 30 %      Monocytes % 6 %      Eosinophils Relative 1 %      Basophils Relative 1 %      Absolute Neutrophils 5.42 Thousands/µL      Absolute Immature Grans 0.03 Thousand/uL      Absolute Lymphocytes 2.60 Thousands/µL      Absolute Monocytes 0.53 Thousand/µL      Eosinophils Absolute 0.06 Thousand/µL      Basophils Absolute 0.05 Thousands/µL     Narrative:      This is an appended report.  These results have been appended to a previously verified report.    Basic metabolic panel [[id number]] Collected: 10/16/24 1315    Lab Status: Final result Specimen: Blood from Arm, Right Updated: 10/16/24 1403     Sodium 138 mmol/L      Potassium 3.6 mmol/L      Chloride 104 mmol/L      CO2 26 mmol/L      ANION GAP 8 mmol/L      BUN 11 mg/dL      Creatinine 0.71 mg/dL      Glucose 87 mg/dL      Calcium 9.2 mg/dL      eGFR 107  ml/min/1.73sq m     Narrative:      National Kidney Disease Foundation guidelines for Chronic Kidney Disease (CKD):     Stage 1 with normal or high GFR (GFR > 90 mL/min/1.73 square meters)    Stage 2 Mild CKD (GFR = 60-89 mL/min/1.73 square meters)    Stage 3A Moderate CKD (GFR = 45-59 mL/min/1.73 square meters)    Stage 3B Moderate CKD (GFR = 30-44 mL/min/1.73 square meters)    Stage 4 Severe CKD (GFR = 15-29 mL/min/1.73 square meters)    Stage 5 End Stage CKD (GFR <15 mL/min/1.73 square meters)  Note: GFR calculation is accurate only with a steady state creatinine            No orders to display       Procedures    ED Medication and Procedure Management   Prior to Admission Medications   Prescriptions Last Dose Informant Patient Reported? Taking?   omeprazole (PriLOSEC OTC) 20 MG tablet   Yes No   Sig: Take 20 mg by mouth daily      Facility-Administered Medications: None     Patient's Medications   Discharge Prescriptions    No medications on file       ED SEPSIS DOCUMENTATION   Time reflects when diagnosis was documented in both MDM as applicable and the Disposition within this note       Time User Action Codes Description Comment    10/16/2024  9:12 PM Hesham Ward [D50.0] Iron deficiency anemia due to chronic blood loss     10/16/2024  9:13 PM Hesham Ward [N92.0] Menorrhagia with regular cycle                  Hesham Ward DO  10/16/24 2122

## 2024-10-17 LAB
ABO GROUP BLD BPU: NORMAL
ABO GROUP BLD BPU: NORMAL
BPU ID: NORMAL
BPU ID: NORMAL
CROSSMATCH: NORMAL
CROSSMATCH: NORMAL
UNIT DISPENSE STATUS: NORMAL
UNIT DISPENSE STATUS: NORMAL
UNIT PRODUCT CODE: NORMAL
UNIT PRODUCT CODE: NORMAL
UNIT PRODUCT VOLUME: 350 ML
UNIT PRODUCT VOLUME: 350 ML
UNIT RH: NORMAL
UNIT RH: NORMAL

## 2024-10-17 NOTE — DISCHARGE INSTRUCTIONS
You received 2 minutes of packed red blood cells today due to hemoglobin of 5.6.  It looks like your iron is low.  This is likely due to heavy menstrual periods.    Please contact the number below to be seen by the gynecologist office before your next menstrual period starts.  Let them know that I spoke to the on-call OB/GYN doctor here at the hospital today and she would like you to be seen in the office within the next 1-2 weeks.

## 2024-10-31 ENCOUNTER — CONSULT (OUTPATIENT)
Dept: OBGYN CLINIC | Facility: CLINIC | Age: 39
End: 2024-10-31
Payer: COMMERCIAL

## 2024-10-31 VITALS
HEIGHT: 60 IN | BODY MASS INDEX: 35.63 KG/M2 | DIASTOLIC BLOOD PRESSURE: 64 MMHG | SYSTOLIC BLOOD PRESSURE: 112 MMHG | WEIGHT: 181.5 LBS

## 2024-10-31 DIAGNOSIS — N92.0 MENORRHAGIA WITH REGULAR CYCLE: Primary | ICD-10-CM

## 2024-10-31 DIAGNOSIS — D50.0 IRON DEFICIENCY ANEMIA DUE TO CHRONIC BLOOD LOSS: ICD-10-CM

## 2024-10-31 PROCEDURE — 99204 OFFICE O/P NEW MOD 45 MIN: CPT | Performed by: OBSTETRICS & GYNECOLOGY

## 2024-10-31 RX ORDER — TRANEXAMIC ACID 650 MG/1
1300 TABLET ORAL 3 TIMES DAILY
Qty: 30 TABLET | Refills: 1 | Status: SHIPPED | OUTPATIENT
Start: 2024-10-31

## 2024-10-31 RX ORDER — SODIUM CHLORIDE 9 MG/ML
20 INJECTION, SOLUTION INTRAVENOUS ONCE
OUTPATIENT
Start: 2024-11-05

## 2024-10-31 NOTE — ASSESSMENT & PLAN NOTE
Will start by prescribing lysteda to help with the immediate bleeding. She is not an OC candidate as she smokes. Will order a pelvic U/S and follow up afterwards as she will not be bleeding. She needs a pap and an Ebx. Will then be able to discuss long term treatment.  Orders:    Ambulatory Referral to Obstetrics / Gynecology    Tranexamic Acid (Lysteda) 650 MG TABS; Take 2 tablets (1,300 mg total) by mouth 3 (three) times a day    US pelvis complete non OB; Future

## 2024-10-31 NOTE — ASSESSMENT & PLAN NOTE
Recommend a course of iron infusion treatments to raise her Hgb and increase iron stores. Therapy plan placed for 8 infusions. She has taken oral iron in the past but was not consistent and she did have some minor GI issues.  Orders:    Ambulatory Referral to Obstetrics / Gynecology    Tranexamic Acid (Lysteda) 650 MG TABS; Take 2 tablets (1,300 mg total) by mouth 3 (three) times a day

## 2024-10-31 NOTE — PROGRESS NOTES
[facility] [place]  [address] [place] [address]  Assessment & Plan  Menorrhagia with regular cycle  Will start by prescribing lysteda to help with the immediate bleeding. She is not an OC candidate as she smokes. Will order a pelvic U/S and follow up afterwards as she will not be bleeding. She needs a pap and an Ebx. Will then be able to discuss long term treatment.  Orders:    Ambulatory Referral to Obstetrics / Gynecology    Tranexamic Acid (Lysteda) 650 MG TABS; Take 2 tablets (1,300 mg total) by mouth 3 (three) times a day    US pelvis complete non OB; Future    Iron deficiency anemia due to chronic blood loss  Recommend a course of iron infusion treatments to raise her Hgb and increase iron stores. Therapy plan placed for 8 infusions. She has taken oral iron in the past but was not consistent and she did have some minor GI issues.  Orders:    Ambulatory Referral to Obstetrics / Gynecology    Tranexamic Acid (Lysteda) 650 MG TABS; Take 2 tablets (1,300 mg total) by mouth 3 (three) times a day    Subjective:   Gabbie Chávez is a 39 y.o.  .  CC:   Chief Complaint   Patient presents with    Multiple Concerns     Consult and  follow up for ER visit for menorrhagia concerns -  heavy bleeding  and last 7 days non stop with clots.        HPI: Pt is here to follow up her ER visit from last week. She was found to be severely anemic with iron deficiency and she received 2UpRBC. She states her menses have always been very heavy with clots and pain but she states that her previous gyns have told her that it is normal. She has not had gyn care in nearly 10 years. Her ER Hgb was 5.6 and the MCV was 56. She states that she is bleeding today but not as heavy as she has been. She declines exam today due to bleeding    ROS: Negative except as noted in HPI    Patient's last menstrual period was 10/29/2024 (approximate).       She  reports that she is not currently sexually active and has had partner(s)  who are male.       The following portions of the patient's history were reviewed and updated as appropriate:   Past Medical History:   Diagnosis Date    Anemia      Past Surgical History:   Procedure Laterality Date     SECTION  2005     SECTION  10/2014    HERNIA REPAIR       Family History   Problem Relation Age of Onset    Cancer Maternal Grandmother     Dementia Paternal Grandmother      Social History     Socioeconomic History    Marital status: Single     Spouse name: None    Number of children: None    Years of education: None    Highest education level: None   Occupational History    None   Tobacco Use    Smoking status: Every Day     Current packs/day: 0.25     Average packs/day: 0.3 packs/day for 0.8 years (0.2 ttl pk-yrs)     Types: Cigarettes     Start date:     Smokeless tobacco: Never   Vaping Use    Vaping status: Every Day    Substances: Nicotine   Substance and Sexual Activity    Alcohol use: Yes     Comment: socially    Drug use: Not Currently     Types: Marijuana    Sexual activity: Not Currently     Partners: Male   Other Topics Concern    None   Social History Narrative    None     Social Determinants of Health     Financial Resource Strain: Not on file   Food Insecurity: Not on file   Transportation Needs: Not on file   Physical Activity: Not on file   Stress: Not on file   Social Connections: Not on file   Intimate Partner Violence: Not on file   Housing Stability: Not on file     Outpatient Medications Marked as Taking for the 10/31/24 encounter (Consult) with Sekou Calderon MD   Medication    omeprazole (PriLOSEC OTC) 20 MG tablet    Tranexamic Acid (Lysteda) 650 MG TABS     No Known Allergies        Objective:  /64 (BP Location: Left arm, Patient Position: Sitting, Cuff Size: Standard)   Ht 5' (1.524 m)   Wt 82.3 kg (181 lb 8 oz)   LMP 10/29/2024 (Approximate)   Breastfeeding No   BMI 35.45 kg/m²              Sekou Calderon MD  10/31/2024 2:40 PM

## 2024-11-20 ENCOUNTER — TELEPHONE (OUTPATIENT)
Age: 39
End: 2024-11-20

## 2024-11-20 ENCOUNTER — HOSPITAL ENCOUNTER (OUTPATIENT)
Dept: ULTRASOUND IMAGING | Facility: HOSPITAL | Age: 39
Discharge: HOME | End: 2024-11-20
Attending: OBSTETRICS & GYNECOLOGY
Payer: COMMERCIAL

## 2024-11-20 DIAGNOSIS — N92.0 MENORRHAGIA WITH REGULAR CYCLE: ICD-10-CM

## 2024-11-20 PROCEDURE — 76830 TRANSVAGINAL US NON-OB: CPT

## 2024-11-20 PROCEDURE — 76856 US EXAM PELVIC COMPLETE: CPT

## 2024-11-20 NOTE — TELEPHONE ENCOUNTER
Pt needs a prior auth for her infusions, her first appt is 11/22. They need a prior auth by 11/21 2pm. Please send prior auth to [employer] -[phone]

## 2024-11-21 NOTE — TELEPHONE ENCOUNTER
[facility] and doesn't require prior authorization for IV Iron Fusions.

## 2024-11-22 ENCOUNTER — HOSPITAL ENCOUNTER (OUTPATIENT)
Dept: INFUSION CENTER | Facility: HOSPITAL | Age: 39
End: 2024-11-22
Attending: OBSTETRICS & GYNECOLOGY
Payer: COMMERCIAL

## 2024-11-22 VITALS
DIASTOLIC BLOOD PRESSURE: 79 MMHG | HEART RATE: 85 BPM | OXYGEN SATURATION: 98 % | TEMPERATURE: 98.1 F | RESPIRATION RATE: 12 BRPM | SYSTOLIC BLOOD PRESSURE: 132 MMHG

## 2024-11-22 DIAGNOSIS — N92.0 MENORRHAGIA WITH REGULAR CYCLE: Primary | ICD-10-CM

## 2024-11-22 DIAGNOSIS — D50.0 IRON DEFICIENCY ANEMIA DUE TO CHRONIC BLOOD LOSS: ICD-10-CM

## 2024-11-22 PROCEDURE — 96365 THER/PROPH/DIAG IV INF INIT: CPT

## 2024-11-22 RX ORDER — SODIUM CHLORIDE 9 MG/ML
20 INJECTION, SOLUTION INTRAVENOUS ONCE
Status: COMPLETED | OUTPATIENT
Start: 2024-11-22 | End: 2024-11-22

## 2024-11-22 RX ORDER — SODIUM CHLORIDE 9 MG/ML
20 INJECTION, SOLUTION INTRAVENOUS ONCE
OUTPATIENT
Start: 2024-11-25

## 2024-11-22 RX ADMIN — IRON SUCROSE 200 MG: 20 INJECTION, SOLUTION INTRAVENOUS at 13:12

## 2024-11-22 RX ADMIN — SODIUM CHLORIDE 20 ML/HR: 9 INJECTION, SOLUTION INTRAVENOUS at 13:12

## 2024-11-22 NOTE — PROGRESS NOTES
Gabbie Chávez  tolerated treatment well with no complications.      Gabbie Chávez is aware of future appt on 11/25/2024 at 02:00 PM.     AVS printed and given to Gabbie Chávez:  Yes

## 2024-11-25 ENCOUNTER — HOSPITAL ENCOUNTER (OUTPATIENT)
Dept: INFUSION CENTER | Facility: HOSPITAL | Age: 39
Discharge: HOME | End: 2024-11-25
Attending: OBSTETRICS & GYNECOLOGY
Payer: COMMERCIAL

## 2024-11-25 VITALS
SYSTOLIC BLOOD PRESSURE: 142 MMHG | HEART RATE: 82 BPM | RESPIRATION RATE: 16 BRPM | OXYGEN SATURATION: 100 % | TEMPERATURE: 97.7 F | DIASTOLIC BLOOD PRESSURE: 91 MMHG

## 2024-11-25 DIAGNOSIS — N92.0 MENORRHAGIA WITH REGULAR CYCLE: Primary | ICD-10-CM

## 2024-11-25 DIAGNOSIS — D50.0 IRON DEFICIENCY ANEMIA DUE TO CHRONIC BLOOD LOSS: ICD-10-CM

## 2024-11-25 PROCEDURE — 96365 THER/PROPH/DIAG IV INF INIT: CPT

## 2024-11-25 RX ORDER — SODIUM CHLORIDE 9 MG/ML
20 INJECTION, SOLUTION INTRAVENOUS ONCE
Status: COMPLETED | OUTPATIENT
Start: 2024-11-25 | End: 2024-11-25

## 2024-11-25 RX ORDER — SODIUM CHLORIDE 9 MG/ML
20 INJECTION, SOLUTION INTRAVENOUS ONCE
Status: CANCELLED | OUTPATIENT
Start: 2024-11-29

## 2024-11-25 RX ADMIN — IRON SUCROSE 200 MG: 20 INJECTION, SOLUTION INTRAVENOUS at 14:22

## 2024-11-25 RX ADMIN — SODIUM CHLORIDE 20 ML/HR: 9 INJECTION, SOLUTION INTRAVENOUS at 14:27

## 2024-11-25 NOTE — PROGRESS NOTES
Patient arrived for iron infusion. She mentions that she had some back spasms and nausea over the weekend, after her first iron infusion on Friday.  She asks if this is a side effect from the iron.  This RN educated that it would be unusual/unexpected, but could reach out to ordering provider to inquire.  Patient states it may have been due to her menstrual cycle.  She would like to proceed with iron today and see how she feels over the next few days.  She specifically denied any itching, hives, shortness of breath, or other allergy-like symptoms from iron infusion. This RN encouraged patient to hydrate over the next few days, and for her to reach out to her OBGYN if she should have further cramping/concerns at home.  She verbalized understanding. No other needs at this time.

## 2024-11-25 NOTE — PROGRESS NOTES
Gabbie Chávez  tolerated treatment well with no complications.      Gabbie Chávez is aware of future appt on 11/29/2024 at 1400.     AVS printed and given to Gabbie Chávez:    No (Declined by Gabbie Chávez)

## 2024-11-29 ENCOUNTER — HOSPITAL ENCOUNTER (OUTPATIENT)
Dept: INFUSION CENTER | Facility: HOSPITAL | Age: 39
End: 2024-11-29
Attending: OBSTETRICS & GYNECOLOGY
Payer: COMMERCIAL

## 2024-11-29 DIAGNOSIS — N92.0 MENORRHAGIA WITH REGULAR CYCLE: Primary | ICD-10-CM

## 2024-11-29 DIAGNOSIS — D50.0 IRON DEFICIENCY ANEMIA DUE TO CHRONIC BLOOD LOSS: ICD-10-CM

## 2024-11-29 PROCEDURE — 96365 THER/PROPH/DIAG IV INF INIT: CPT

## 2024-11-29 RX ORDER — SODIUM CHLORIDE 9 MG/ML
20 INJECTION, SOLUTION INTRAVENOUS ONCE
Status: CANCELLED | OUTPATIENT
Start: 2024-12-02

## 2024-11-29 RX ORDER — SODIUM CHLORIDE 9 MG/ML
20 INJECTION, SOLUTION INTRAVENOUS ONCE
Status: COMPLETED | OUTPATIENT
Start: 2024-11-29 | End: 2024-11-29

## 2024-11-29 RX ADMIN — SODIUM CHLORIDE 20 ML/HR: 9 INJECTION, SOLUTION INTRAVENOUS at 14:27

## 2024-11-29 RX ADMIN — IRON SUCROSE 200 MG: 20 INJECTION, SOLUTION INTRAVENOUS at 14:28

## 2024-11-29 NOTE — PROGRESS NOTES
..Gabbie Chávez  tolerated treatment well with no complications.      Gabbie Chávez is aware of future appt on 12/2 at 2:00.     AVS printed and given to Gabbie Chávez:  No (Declined by Gabbie Chávez)

## 2024-12-02 ENCOUNTER — HOSPITAL ENCOUNTER (OUTPATIENT)
Dept: INFUSION CENTER | Facility: HOSPITAL | Age: 39
Discharge: HOME | End: 2024-12-02
Attending: OBSTETRICS & GYNECOLOGY
Payer: COMMERCIAL

## 2024-12-02 VITALS
SYSTOLIC BLOOD PRESSURE: 118 MMHG | TEMPERATURE: 97.8 F | OXYGEN SATURATION: 100 % | DIASTOLIC BLOOD PRESSURE: 72 MMHG | RESPIRATION RATE: 16 BRPM | HEART RATE: 85 BPM

## 2024-12-02 DIAGNOSIS — D50.0 IRON DEFICIENCY ANEMIA DUE TO CHRONIC BLOOD LOSS: ICD-10-CM

## 2024-12-02 DIAGNOSIS — N92.0 MENORRHAGIA WITH REGULAR CYCLE: Primary | ICD-10-CM

## 2024-12-02 PROCEDURE — 96365 THER/PROPH/DIAG IV INF INIT: CPT

## 2024-12-02 RX ORDER — SODIUM CHLORIDE 9 MG/ML
20 INJECTION, SOLUTION INTRAVENOUS ONCE
Status: CANCELLED | OUTPATIENT
Start: 2024-12-06

## 2024-12-02 RX ORDER — SODIUM CHLORIDE 9 MG/ML
20 INJECTION, SOLUTION INTRAVENOUS ONCE
Status: COMPLETED | OUTPATIENT
Start: 2024-12-02 | End: 2024-12-02

## 2024-12-02 RX ADMIN — IRON SUCROSE 200 MG: 20 INJECTION, SOLUTION INTRAVENOUS at 14:32

## 2024-12-02 RX ADMIN — SODIUM CHLORIDE 20 ML/HR: 0.9 INJECTION, SOLUTION INTRAVENOUS at 14:31

## 2024-12-02 NOTE — ADDENDUM NOTE
Encounter addended by: Cristopher Hartley, Pharmacist on: 12/2/2024 7:30 AM   Actions taken: [employer]-Vent created or edited

## 2024-12-02 NOTE — PROGRESS NOTES
Gabbie Chávez  tolerated treatment well with no complications.      Gabbie Chávez is aware of future appt on 12/6 at 1400.     AVS printed and given to Gabbie Chávez:    No (Declined by Gabbie Chávez)

## 2024-12-06 ENCOUNTER — HOSPITAL ENCOUNTER (OUTPATIENT)
Dept: INFUSION CENTER | Facility: HOSPITAL | Age: 39
End: 2024-12-06
Attending: OBSTETRICS & GYNECOLOGY
Payer: COMMERCIAL

## 2024-12-06 VITALS
RESPIRATION RATE: 16 BRPM | SYSTOLIC BLOOD PRESSURE: 151 MMHG | HEART RATE: 88 BPM | DIASTOLIC BLOOD PRESSURE: 83 MMHG | TEMPERATURE: 97.2 F | OXYGEN SATURATION: 99 %

## 2024-12-06 DIAGNOSIS — D50.0 IRON DEFICIENCY ANEMIA DUE TO CHRONIC BLOOD LOSS: ICD-10-CM

## 2024-12-06 DIAGNOSIS — N92.0 MENORRHAGIA WITH REGULAR CYCLE: Primary | ICD-10-CM

## 2024-12-06 PROCEDURE — 96365 THER/PROPH/DIAG IV INF INIT: CPT

## 2024-12-06 RX ORDER — SODIUM CHLORIDE 9 MG/ML
20 INJECTION, SOLUTION INTRAVENOUS ONCE
Status: CANCELLED | OUTPATIENT
Start: 2024-12-09

## 2024-12-06 RX ORDER — SODIUM CHLORIDE 9 MG/ML
20 INJECTION, SOLUTION INTRAVENOUS ONCE
Status: COMPLETED | OUTPATIENT
Start: 2024-12-06 | End: 2024-12-06

## 2024-12-06 RX ADMIN — IRON SUCROSE 200 MG: 20 INJECTION, SOLUTION INTRAVENOUS at 14:22

## 2024-12-06 RX ADMIN — SODIUM CHLORIDE 20 ML/HR: 9 INJECTION, SOLUTION INTRAVENOUS at 14:21

## 2024-12-06 NOTE — PROGRESS NOTES
Gabbie Chávez  tolerated treatment well with no complications.      Gabbie Chávez is aware of future appt on 12/9/24 at 1400.     AVS printed and given to Gabbei Chávez:    No (Declined by Gabbie Chávez)

## 2024-12-10 ENCOUNTER — HOSPITAL ENCOUNTER (OUTPATIENT)
Dept: INFUSION CENTER | Facility: HOSPITAL | Age: 39
Discharge: HOME | End: 2024-12-10
Attending: OBSTETRICS & GYNECOLOGY
Payer: COMMERCIAL

## 2024-12-10 DIAGNOSIS — N92.0 MENORRHAGIA WITH REGULAR CYCLE: Primary | ICD-10-CM

## 2024-12-10 DIAGNOSIS — D50.0 IRON DEFICIENCY ANEMIA DUE TO CHRONIC BLOOD LOSS: ICD-10-CM

## 2024-12-10 PROCEDURE — 96367 TX/PROPH/DG ADDL SEQ IV INF: CPT

## 2024-12-10 PROCEDURE — 96365 THER/PROPH/DIAG IV INF INIT: CPT

## 2024-12-10 RX ORDER — SODIUM CHLORIDE 9 MG/ML
20 INJECTION, SOLUTION INTRAVENOUS ONCE
Status: COMPLETED | OUTPATIENT
Start: 2024-12-10 | End: 2024-12-10

## 2024-12-10 RX ORDER — SODIUM CHLORIDE 9 MG/ML
20 INJECTION, SOLUTION INTRAVENOUS ONCE
Status: CANCELLED | OUTPATIENT
Start: 2024-12-13

## 2024-12-10 RX ADMIN — DIPHENHYDRAMINE HYDROCHLORIDE 25 MG: 50 INJECTION, SOLUTION INTRAMUSCULAR; INTRAVENOUS at 15:03

## 2024-12-10 RX ADMIN — SODIUM CHLORIDE 20 ML/HR: 9 INJECTION, SOLUTION INTRAVENOUS at 15:03

## 2024-12-10 RX ADMIN — IRON SUCROSE 200 MG: 20 INJECTION, SOLUTION INTRAVENOUS at 15:30

## 2024-12-13 ENCOUNTER — HOSPITAL ENCOUNTER (OUTPATIENT)
Dept: INFUSION CENTER | Facility: HOSPITAL | Age: 39
End: 2024-12-13
Attending: OBSTETRICS & GYNECOLOGY
Payer: COMMERCIAL

## 2024-12-13 VITALS
SYSTOLIC BLOOD PRESSURE: 120 MMHG | HEART RATE: 78 BPM | DIASTOLIC BLOOD PRESSURE: 64 MMHG | RESPIRATION RATE: 18 BRPM | TEMPERATURE: 96.8 F

## 2024-12-13 DIAGNOSIS — D50.0 IRON DEFICIENCY ANEMIA DUE TO CHRONIC BLOOD LOSS: ICD-10-CM

## 2024-12-13 DIAGNOSIS — N92.0 MENORRHAGIA WITH REGULAR CYCLE: Primary | ICD-10-CM

## 2024-12-13 PROCEDURE — 96365 THER/PROPH/DIAG IV INF INIT: CPT

## 2024-12-13 PROCEDURE — 96367 TX/PROPH/DG ADDL SEQ IV INF: CPT

## 2024-12-13 RX ORDER — SODIUM CHLORIDE 9 MG/ML
20 INJECTION, SOLUTION INTRAVENOUS ONCE
Status: CANCELLED | OUTPATIENT
Start: 2024-12-16

## 2024-12-13 RX ORDER — SODIUM CHLORIDE 9 MG/ML
20 INJECTION, SOLUTION INTRAVENOUS ONCE
Status: COMPLETED | OUTPATIENT
Start: 2024-12-13 | End: 2024-12-13

## 2024-12-13 RX ADMIN — DIPHENHYDRAMINE HYDROCHLORIDE 25 MG: 50 INJECTION, SOLUTION INTRAMUSCULAR; INTRAVENOUS at 14:12

## 2024-12-13 RX ADMIN — SODIUM CHLORIDE 20 ML/HR: 9 INJECTION, SOLUTION INTRAVENOUS at 14:12

## 2024-12-13 RX ADMIN — IRON SUCROSE 200 MG: 20 INJECTION, SOLUTION INTRAVENOUS at 14:37

## 2024-12-13 NOTE — PROGRESS NOTES
Gabbie Chávez  tolerated treatment well with no complications.      Gabbie Chávez is aware of future appt on 12/16/2024 at 02:00 PM.     AVS printed and given to Gabbie Chávez:  No (Declined by Gabbie Chávez)

## 2024-12-20 ENCOUNTER — HOSPITAL ENCOUNTER (OUTPATIENT)
Dept: INFUSION CENTER | Facility: HOSPITAL | Age: 39
End: 2024-12-20
Attending: OBSTETRICS & GYNECOLOGY
Payer: COMMERCIAL

## 2024-12-20 VITALS
OXYGEN SATURATION: 98 % | HEART RATE: 85 BPM | DIASTOLIC BLOOD PRESSURE: 74 MMHG | SYSTOLIC BLOOD PRESSURE: 129 MMHG | RESPIRATION RATE: 16 BRPM | TEMPERATURE: 97.2 F

## 2024-12-20 DIAGNOSIS — D50.0 IRON DEFICIENCY ANEMIA DUE TO CHRONIC BLOOD LOSS: ICD-10-CM

## 2024-12-20 DIAGNOSIS — N92.0 MENORRHAGIA WITH REGULAR CYCLE: Primary | ICD-10-CM

## 2024-12-20 RX ORDER — SODIUM CHLORIDE 9 MG/ML
20 INJECTION, SOLUTION INTRAVENOUS ONCE
Status: CANCELLED | OUTPATIENT
Start: 2024-12-23

## 2024-12-20 RX ORDER — SODIUM CHLORIDE 9 MG/ML
20 INJECTION, SOLUTION INTRAVENOUS ONCE
Status: COMPLETED | OUTPATIENT
Start: 2024-12-20 | End: 2024-12-20

## 2024-12-20 RX ADMIN — IRON SUCROSE 200 MG: 20 INJECTION, SOLUTION INTRAVENOUS at 14:46

## 2024-12-20 RX ADMIN — DIPHENHYDRAMINE HYDROCHLORIDE 25 MG: 50 INJECTION, SOLUTION INTRAMUSCULAR; INTRAVENOUS at 14:12

## 2024-12-20 RX ADMIN — SODIUM CHLORIDE 20 ML/HR: 9 INJECTION, SOLUTION INTRAVENOUS at 14:11

## 2024-12-20 NOTE — PROGRESS NOTES
Gabbie Chávez  tolerated treatment well with no complications.      Gabbie Chváez does not have a follow up with Dr. Calderon. She was instructed to call the office and schedule.     AVS printed and given to Gabbie Chávez:   No (Declined by Gabbie Chávez)

## 2024-12-24 ENCOUNTER — RESULTS FOLLOW-UP (OUTPATIENT)
Dept: OBGYN CLINIC | Facility: CLINIC | Age: 39
End: 2024-12-24

## 2025-01-20 ENCOUNTER — ANNUAL EXAM (OUTPATIENT)
Dept: OBGYN CLINIC | Facility: CLINIC | Age: 40
End: 2025-01-20
Payer: COMMERCIAL

## 2025-01-20 VITALS
SYSTOLIC BLOOD PRESSURE: 112 MMHG | DIASTOLIC BLOOD PRESSURE: 70 MMHG | HEIGHT: 60 IN | WEIGHT: 191 LBS | BODY MASS INDEX: 37.5 KG/M2

## 2025-01-20 DIAGNOSIS — Z12.31 BREAST CANCER SCREENING BY MAMMOGRAM: ICD-10-CM

## 2025-01-20 DIAGNOSIS — Z12.4 SCREENING FOR MALIGNANT NEOPLASM OF THE CERVIX: ICD-10-CM

## 2025-01-20 DIAGNOSIS — D50.0 IRON DEFICIENCY ANEMIA DUE TO CHRONIC BLOOD LOSS: ICD-10-CM

## 2025-01-20 DIAGNOSIS — Z01.419 ROUTINE GYNECOLOGICAL EXAMINATION: Primary | ICD-10-CM

## 2025-01-20 DIAGNOSIS — N92.0 MENORRHAGIA WITH REGULAR CYCLE: ICD-10-CM

## 2025-01-20 PROCEDURE — 99395 PREV VISIT EST AGE 18-39: CPT | Performed by: OBSTETRICS & GYNECOLOGY

## 2025-01-20 PROCEDURE — 99213 OFFICE O/P EST LOW 20 MIN: CPT | Performed by: OBSTETRICS & GYNECOLOGY

## 2025-01-23 LAB
CYTOLOGIST CVX/VAG CYTO: NORMAL
DX ICD CODE: NORMAL
HPV GENOTYPE REFLEX: NORMAL
HPV I/H RISK 4 DNA CVX QL PROBE+SIG AMP: NEGATIVE
OTHER STN SPEC: NORMAL
PATH REPORT.FINAL DX SPEC: NORMAL
SL AMB NOTE:: NORMAL
SL AMB SPECIMEN ADEQUACY: NORMAL
SL AMB TEST METHODOLOGY: NORMAL

## 2025-01-27 ENCOUNTER — PROCEDURE VISIT (OUTPATIENT)
Dept: OBGYN CLINIC | Facility: CLINIC | Age: 40
End: 2025-01-27
Payer: COMMERCIAL

## 2025-01-27 VITALS
HEIGHT: 60 IN | DIASTOLIC BLOOD PRESSURE: 72 MMHG | BODY MASS INDEX: 38.05 KG/M2 | WEIGHT: 193.8 LBS | SYSTOLIC BLOOD PRESSURE: 114 MMHG

## 2025-01-27 DIAGNOSIS — Z32.02 NEGATIVE PREGNANCY TEST: ICD-10-CM

## 2025-01-27 DIAGNOSIS — Z30.430 ENCOUNTER FOR INSERTION OF MIRENA IUD: Primary | ICD-10-CM

## 2025-01-27 LAB — SL AMB POCT URINE HCG: NORMAL

## 2025-01-27 PROCEDURE — 81025 URINE PREGNANCY TEST: CPT | Performed by: OBSTETRICS & GYNECOLOGY

## 2025-01-27 PROCEDURE — 58300 INSERT INTRAUTERINE DEVICE: CPT | Performed by: OBSTETRICS & GYNECOLOGY

## 2025-01-27 NOTE — PROGRESS NOTES
IUD Procedure    Date/Time: 1/27/2025 11:15 PM    Performed by: Sekou Calderon MD  Authorized by: Sekou Calderon MD    Verbal consent obtained?: Yes    Risks and benefits: Risks, benefits and alternatives were discussed    Consent given by:  Patient  Time Out:     Time out: Immediately prior to the procedure a time out was called    Patient states understanding of procedure being performed: Yes    Patient identity confirmed:  Verbally with patient  Select procedure: IUD insertion    IUD Insertion:     Pelvic exam performed: yes      Negative urine pregnancy test: yes      Cervix cleaned and prepped: yes      Speculum placed in vagina: yes      Tenaculum applied to cervix: yes      IUD inserted with no complications: yes      Strings trimmed: yes      Uterus sounded: yes      Uterus sound depth (cm):  10    IUD type:  1 each Levonorgestrel 20 MCG/DAY  Post-procedure:     Patient tolerated procedure well: yes      Patient will follow up after next period: yes

## 2025-02-09 NOTE — ASSESSMENT & PLAN NOTE
Reviewed U/S and previous lab work. CBC ordered. Reviewed options re heavy menses. Explained that she needs to do something as she has required a blood transfusion  and iron infusions due to severe anemia. After reviewing options she has decided on a Mirena IUD. Will schedule.

## 2025-02-09 NOTE — PROGRESS NOTES
[facility] - [facility]  [address] [place] [address]    ASSESSMENT/PLAN: Gabbie Chávez is a 39 y.o.  who presents for annual gynecologic exam.    Encounter for routine gynecologic examination  - Routine well woman exam completed today.  - Cervical Cancer Screening: Current ASCCP Guidelines reviewed. Last Pap: 2025 . Next Pap Due: today  - HPV Vaccination status: Not immunized  - Contraceptive counseling discussed.  Current contraception: none/abstinence  - Breast Cancer Screening: Last Mammogram Not on file, ordered  - Colorectal cancer screening was not ordered.  - The following were reviewed in today's visit: breast self exam, mammography screening ordered, and anemia from heavy menses    Additional problems addressed during this visit:  1. Routine gynecological examination  2. Menorrhagia with regular cycle  Assessment & Plan:  Reviewed options re heavy menses. Explained that she needs to do something as she has required a blood transfusion  and iron infusions due to severe anemia. After reviewing options she has decided on a Mirena IUD. Will schedule.   Orders:  -     CBC and Platelet; Future  -     CBC and Platelet  3. Iron deficiency anemia due to chronic blood loss  -     CBC and Platelet; Future  -     CBC and Platelet  4. Breast cancer screening by mammogram  -     Mammo screening bilateral w 3d and cad; Future  5. Screening for malignant neoplasm of the cervix  -     IGP, Aptima HPV, Rfx 16/18,45      CC:  Annual Gynecologic Examination    HPI: Gabbie Chávez is a 39 y.o.  who presents for annual gynecologic examination.  HPI    The following portions of the patient's history were reviewed and updated as appropriate: She  has a past medical history of Anemia, Clotting disorder (HCC), and History of transfusion.  She  has a past surgical history that includes Hernia repair ();  section (2005); and  section (10/2014).  Her family history includes Cancer  in her maternal grandfather, maternal grandmother, paternal grandfather, and paternal grandmother; Dementia in her paternal grandmother.  She  reports that she has been smoking cigarettes and cigars. She started smoking about 13 months ago. She has a 3.7 pack-year smoking history. She has never been exposed to tobacco smoke. She has never used smokeless tobacco. She reports that she does not currently use alcohol after a past usage of about 2.0 standard drinks of alcohol per week. She reports that she does not currently use drugs after having used the following drugs: Marijuana.  Current Outpatient Medications   Medication Sig Dispense Refill    omeprazole (PriLOSEC OTC) 20 MG tablet Take 20 mg by mouth daily      Tranexamic Acid (Lysteda) 650 MG TABS Take 2 tablets (1,300 mg total) by mouth 3 (three) times a day (Patient not taking: Reported on 1/27/2025) 30 tablet 1     No current facility-administered medications for this visit.     She has no known allergies..    Review of Systems      Objective:  /70 (BP Location: Left arm, Patient Position: Sitting, Cuff Size: Large)   Ht 5' (1.524 m)   Wt 86.6 kg (191 lb)   LMP 01/13/2025 (Exact Date)   BMI 37.30 kg/m²    Physical Exam      PE:  General Appearance: alert and oriented, in no acute distress.   HEENT: PERRL, thyroid without masses or tenderness  Breast: No masses, tenderness, skin changes, nipple D/C or axillary or supraclavicular adenopathy  Abdomen: Soft, non-tender, non-distended, no masses, no rebound or guarding.  Pelvic:       External genitalia: Normal appearance, no abnormal pigmentation, no lesions or masses. Normal Bartholin's and Arpin's.      Urinary system: Urethral meatus normal, bladder non-tender.      Vaginal: normal mucosa without prolapse or lesions. Normal-appearing physiologic discharge      Cervix: Normal-appearing, well-epithelialized, no gross lesions or masses No cervical motion tenderness.      Adnexa: No adnexal masses or  tenderness noted.      Uterus: Normal-sized, regular contour, midline, mobile, no uterine tenderness.  Extremities: Normal range of motion.   Skin: normal, no rash or abnormalities  Neurologic: alert, oriented x3  Psychiatric: Appropriate affect, mood stable, cooperative with exam.

## 2025-04-21 ENCOUNTER — NURSE TRIAGE (OUTPATIENT)
Age: 40
End: 2025-04-21

## 2025-04-21 NOTE — TELEPHONE ENCOUNTER
"FOLLOW UP: Appointment scheduled for evaluation. Routing to provider as fyi.     REASON FOR CONVERSATION: Contraception    SYMPTOMS: Heavy vaginal bleeding, 10/10 abdominal pain    OTHER: Patient had mirena IUD inserted 1/27/25. States last month, she used diva cup during menstrual cycle and is concerned IUD may have gotten pulled out by diva cup. Patient states she thought she saw something white in diva cup when she was disposing blood. Now reports heavier vaginal bleeding than normal while on IUD. Denies severe vaginal bleeding. States abdominal pain is 10/10, although declines ER because states she feels abdominal pain is not that bad. Advised patient monitor cycles and call back or go to ER with severe abdominal pain/vaginal bleeding, or continuous fevers/chills. Patient verbalized understanding.  Appointment scheduled.     DISPOSITION: Go to ED Now      Reason for Disposition   SEVERE abdominal pain (e.g., excruciating) and present > 1 hour    Answer Assessment - Initial Assessment Questions  1. TYPE: \"What type of IUD do you have?\"       Mirena IUD  2. START DATE:  \"When was your IUD inserted?\" (e.g., date; weeks, months, years ago)       Inserted 1/27/25  3. SYMPTOM: \"What is the main symptom (or question) you're concerned about?\"       Heavier menstrual   4. ONSET: \"When did the  s/s start?\"      4/18/25  5. VAGINAL BLEEDING: \"Are you having any unusual vaginal bleeding?\"   Denies severe bleeding, but it's heavy.   6. ABDOMEN OR PELVIC PAIN: \"Are you having any pain in your abdomen or pelvic area?\" (Scale: 0, 1-10; none, mild, moderate, severe)      10/10 - tried Tylenol and advil, has not helped.   7. FEVER: \"Is there a fever?\" If Yes, ask: \"What is the temperature, how was it measured, and when did it start?\"      Denies fever/chills  8. PREGNANCY: \"Are you concerned that you might be pregnant?\" \"When was your last menstrual period?\"      LMP 4/18/25 - ongoing  Bleeding was lighter with IUD, but now " bleeding seems heavier  Patient concerned mirena IUD came out one month ago.    Protocols used: Contraception - IUD Symptoms and Questions-Adult-[place]

## 2025-04-24 ENCOUNTER — OFFICE VISIT (OUTPATIENT)
Dept: OBGYN CLINIC | Facility: CLINIC | Age: 40
End: 2025-04-24
Payer: COMMERCIAL

## 2025-04-24 VITALS
DIASTOLIC BLOOD PRESSURE: 68 MMHG | WEIGHT: 200.4 LBS | BODY MASS INDEX: 39.34 KG/M2 | SYSTOLIC BLOOD PRESSURE: 118 MMHG | HEIGHT: 60 IN

## 2025-04-24 DIAGNOSIS — Z30.431 CONTRACEPTION, DEVICE INTRAUTERINE, CHECKING: Primary | ICD-10-CM

## 2025-04-24 PROCEDURE — 99212 OFFICE O/P EST SF 10 MIN: CPT | Performed by: OBSTETRICS & GYNECOLOGY

## 2025-05-11 ENCOUNTER — RESULTS FOLLOW-UP (OUTPATIENT)
Dept: OBGYN CLINIC | Facility: CLINIC | Age: 40
End: 2025-05-11

## 2025-05-15 NOTE — PROGRESS NOTES
"Name: Gabbie Chávez      : 1985      MRN: [id number]  Encounter Provider: Sekou Calderon MD  Encounter Date: 2025   Encounter department: [facility] [place]  :  Assessment & Plan  Contraception, device intrauterine, checking  IUD in place. String visualized.           History of Present Illness   HPI  Gabbie Chávez is a 40 y.o. female who presents for IUD check  History obtained from: patient    Review of Systems  Medications Ordered Prior to Encounter[1]   Social History     Tobacco Use    Smoking status: Every Day     Current packs/day: 0.25     Average packs/day: 0.2 packs/day for 15.2 years (3.8 ttl pk-yrs)     Types: Cigarettes, Cigars     Start date:      Passive exposure: Never    Smokeless tobacco: Never   Vaping Use    Vaping status: Every Day    Substances: Nicotine   Substance and Sexual Activity    Alcohol use: Yes     Alcohol/week: 2.0 standard drinks of alcohol     Types: 2 Glasses of wine per week     Comment: socially    Drug use: Not Currently     Types: Marijuana    Sexual activity: Not Currently     Partners: Male     Birth control/protection: Abstinence        Objective   /68 (BP Location: Left arm, Patient Position: Sitting, Cuff Size: Large)   Ht 4' 11.75\" (1.518 m)   Wt 90.9 kg (200 lb 6.4 oz)   LMP 2025 (Exact Date)   BMI 39.47 kg/m²      Physical Exam           [1]   Current Outpatient Medications on File Prior to Visit   Medication Sig Dispense Refill    omeprazole (PriLOSEC OTC) 20 MG tablet Take 20 mg by mouth daily      Tranexamic Acid (Lysteda) 650 MG TABS Take 2 tablets (1,300 mg total) by mouth 3 (three) times a day (Patient not taking: Reported on 2025) 30 tablet 1     No current facility-administered medications on file prior to visit.     "